# Patient Record
Sex: FEMALE | Race: WHITE | NOT HISPANIC OR LATINO
[De-identification: names, ages, dates, MRNs, and addresses within clinical notes are randomized per-mention and may not be internally consistent; named-entity substitution may affect disease eponyms.]

---

## 2017-06-23 ENCOUNTER — APPOINTMENT (OUTPATIENT)
Dept: ORTHOPEDIC SURGERY | Facility: CLINIC | Age: 77
End: 2017-06-23

## 2017-06-23 VITALS
TEMPERATURE: 97.9 F | WEIGHT: 146 LBS | BODY MASS INDEX: 29.43 KG/M2 | DIASTOLIC BLOOD PRESSURE: 78 MMHG | HEART RATE: 88 BPM | HEIGHT: 59 IN | SYSTOLIC BLOOD PRESSURE: 133 MMHG

## 2017-06-23 DIAGNOSIS — T84.84XA PAIN DUE TO INTERNAL ORTHOPEDIC PROSTHETIC DEVICES, IMPLANTS AND GRAFTS, INITIAL ENCOUNTER: ICD-10-CM

## 2017-06-23 DIAGNOSIS — Z96.659 PAIN DUE TO INTERNAL ORTHOPEDIC PROSTHETIC DEVICES, IMPLANTS AND GRAFTS, INITIAL ENCOUNTER: ICD-10-CM

## 2017-10-20 ENCOUNTER — APPOINTMENT (OUTPATIENT)
Dept: ORTHOPEDIC SURGERY | Facility: CLINIC | Age: 77
End: 2017-10-20
Payer: MEDICARE

## 2017-10-20 VITALS
BODY MASS INDEX: 29.43 KG/M2 | HEART RATE: 66 BPM | SYSTOLIC BLOOD PRESSURE: 127 MMHG | WEIGHT: 146 LBS | DIASTOLIC BLOOD PRESSURE: 83 MMHG | HEIGHT: 59 IN

## 2017-10-20 DIAGNOSIS — Z47.1 AFTERCARE FOLLOWING JOINT REPLACEMENT SURGERY: ICD-10-CM

## 2017-10-20 DIAGNOSIS — Z96.651 AFTERCARE FOLLOWING JOINT REPLACEMENT SURGERY: ICD-10-CM

## 2017-10-20 DIAGNOSIS — Z96.651 PRESENCE OF RIGHT ARTIFICIAL KNEE JOINT: ICD-10-CM

## 2017-10-20 PROCEDURE — 73562 X-RAY EXAM OF KNEE 3: CPT | Mod: RT

## 2017-10-20 PROCEDURE — 99213 OFFICE O/P EST LOW 20 MIN: CPT

## 2018-10-29 ENCOUNTER — APPOINTMENT (OUTPATIENT)
Dept: PULMONOLOGY | Facility: CLINIC | Age: 78
End: 2018-10-29
Payer: MEDICARE

## 2018-10-29 VITALS — SYSTOLIC BLOOD PRESSURE: 122 MMHG | DIASTOLIC BLOOD PRESSURE: 82 MMHG

## 2018-10-29 VITALS — HEART RATE: 86 BPM | WEIGHT: 150 LBS | OXYGEN SATURATION: 98 % | HEIGHT: 59 IN | BODY MASS INDEX: 30.24 KG/M2

## 2018-10-29 DIAGNOSIS — J32.9 CHRONIC SINUSITIS, UNSPECIFIED: ICD-10-CM

## 2018-10-29 DIAGNOSIS — K74.3 PRIMARY BILIARY CIRRHOSIS: ICD-10-CM

## 2018-10-29 PROCEDURE — 94060 EVALUATION OF WHEEZING: CPT

## 2018-10-29 PROCEDURE — 99204 OFFICE O/P NEW MOD 45 MIN: CPT | Mod: 25

## 2018-10-29 PROCEDURE — 94664 DEMO&/EVAL PT USE INHALER: CPT | Mod: 59

## 2019-04-16 ENCOUNTER — TRANSCRIPTION ENCOUNTER (OUTPATIENT)
Age: 79
End: 2019-04-16

## 2019-04-16 ENCOUNTER — APPOINTMENT (OUTPATIENT)
Dept: PULMONOLOGY | Facility: CLINIC | Age: 79
End: 2019-04-16
Payer: MEDICARE

## 2019-04-16 VITALS — BODY MASS INDEX: 30.7 KG/M2 | WEIGHT: 152 LBS

## 2019-04-16 VITALS — HEART RATE: 87 BPM | OXYGEN SATURATION: 97 % | SYSTOLIC BLOOD PRESSURE: 140 MMHG | DIASTOLIC BLOOD PRESSURE: 80 MMHG

## 2019-04-16 PROCEDURE — 94010 BREATHING CAPACITY TEST: CPT

## 2019-04-16 PROCEDURE — 99214 OFFICE O/P EST MOD 30 MIN: CPT | Mod: 25

## 2019-04-16 NOTE — CONSULT LETTER
[Dear  ___] : Dear  [unfilled], [Consult Letter:] : I had the pleasure of evaluating your patient, [unfilled]. [Please see my note below.] : Please see my note below. [Consult Closing:] : Thank you very much for allowing me to participate in the care of this patient.  If you have any questions, please do not hesitate to contact me. [Sincerely,] : Sincerely, [DrSilvia  ___] : Dr. LEONARD

## 2019-04-16 NOTE — ASSESSMENT
[FreeTextEntry1] : Mild intermittent asthma controlled with LABA/ICS\par Silent Sinus Syndrome - inactive\par Suspect mild GERD\par Obesity

## 2019-04-16 NOTE — PHYSICAL EXAM
[Normal Conjunctiva] : the conjunctiva exhibited no abnormalities [Normal Oropharynx] : normal oropharynx [Enlarged Base of the Tongue] : enlargement of the base of the tongue [I] : I [Neck Appearance] : the appearance of the neck was normal [Jugular Venous Distention Increased] : there was no jugular-venous distention [Thyroid Diffuse Enlargement] : the thyroid was not enlarged [Heart Sounds] : normal S1 and S2 [Arterial Pulses Normal] : the arterial pulses were normal [Edema] : no peripheral edema present [Respiration, Rhythm And Depth] : normal respiratory rhythm and effort [Auscultation Breath Sounds / Voice Sounds] : lungs were clear to auscultation bilaterally [Lungs Percussion] : the lungs were normal to percussion [Bowel Sounds] : normal bowel sounds [Abdomen Soft] : soft [Abdomen Tenderness] : non-tender [Abnormal Walk] : normal gait [Nail Clubbing] : no clubbing of the fingernails [Cyanosis, Localized] : no localized cyanosis [No Focal Deficits] : no focal deficits [Oriented To Time, Place, And Person] : oriented to person, place, and time [Impaired Insight] : insight and judgment were intact [Affect] : the affect was normal [Memory Recent] : recent memory was not impaired [Skin Turgor] : normal skin turgor [] : no rash [FreeTextEntry1] : obese [Low Lying Soft Palate] : no low lying soft palate

## 2019-04-16 NOTE — HISTORY OF PRESENT ILLNESS
[FreeTextEntry1] : 77 yo obese female distant trivial smoker with long-standing, mild asthma managed by Dr Alanis, now looking for FU in the [facility] system.\par Chronic, stable, mild, two flight ASKEW relieved with 1 minute of rest\par Silent sinus syndrome followed by Dr Queen in the past - largely resolved post drainage\par Virtually never requiring steroids\par Never intubated\par No known allergies\par No asa intolerance nor nasal polyposis\par Controlled on Symbicort alone\par Rarely needs rescue albuterol \par \par 4/16/19\par Doing OK\par Symbicort is expensive

## 2019-04-24 ENCOUNTER — APPOINTMENT (OUTPATIENT)
Dept: OTOLARYNGOLOGY | Facility: CLINIC | Age: 79
End: 2019-04-24
Payer: MEDICARE

## 2019-04-24 VITALS
SYSTOLIC BLOOD PRESSURE: 134 MMHG | DIASTOLIC BLOOD PRESSURE: 80 MMHG | HEART RATE: 85 BPM | BODY MASS INDEX: 29.84 KG/M2 | WEIGHT: 148 LBS | HEIGHT: 59 IN

## 2019-04-24 DIAGNOSIS — J34.89 OTHER SPECIFIED DISORDERS OF NOSE AND NASAL SINUSES: ICD-10-CM

## 2019-04-24 DIAGNOSIS — H05.412: ICD-10-CM

## 2019-04-24 PROCEDURE — 31231 NASAL ENDOSCOPY DX: CPT

## 2019-04-24 PROCEDURE — 99214 OFFICE O/P EST MOD 30 MIN: CPT | Mod: 25

## 2019-04-24 NOTE — REASON FOR VISIT
[Initial Evaluation] : an initial evaluation for [Spouse] : spouse [FreeTextEntry2] : Patient of Dr. Queen, Initial visit for silent sinus syndrome in the left maxillary sinus, s/p FESS 7-'15.

## 2019-04-24 NOTE — PHYSICAL EXAM
[Nasal Endoscopy Performed] : nasal endoscopy was performed, see procedure section for findings [Midline] : trachea located in midline position [Normal] : no rashes [de-identified] : Left eye sunken from silent sinus syndrome.

## 2019-04-24 NOTE — HISTORY OF PRESENT ILLNESS
[de-identified] : 78 year old female patient of Dr. Queen, Initial visit for silent sinus syndrome in the left maxillary sinus, s/p FESS 7-'15.  Patient reports recent dental work performed and CT done 3/21/19 impression showing Maxillary is edentulous.  There is bilateral alveolar resorption.  Hypoplastic left maxillary sinus is completely opacified.  There is left silent sinus syndrome.  In the expected location of ADA #six, cross-sectional image 52, the alveolar bone width is less than 1.0mm and alveolar bone height is 12.8mm.  Residual bone measurement as above, measurement prior to surgery recommended.  Patient report sinus pressure on Left side. Denies nasal congestion, sinus pain, rhinorrhea, post nasal drip and sinus infections in the past year. [FreeTextEntry1] : Patient states is a sinus infection since having left maxillary sinus opened.

## 2019-04-24 NOTE — CONSULT LETTER
[Dear  ___] : Dear  [unfilled], [Consult Letter:] : I had the pleasure of evaluating your patient, [unfilled]. [Please see my note below.] : Please see my note below. [Consult Closing:] : Thank you very much for allowing me to participate in the care of this patient.  If you have any questions, please do not hesitate to contact me. [Sincerely,] : Sincerely, [FreeTextEntry3] : Beto Queen MD, KRIS, FACS\par  Department Otolaryngology\par Director of [facility]\par Professor of Otolaryngology, \par [employer] [facility]\par  [DrSilvia  ___] : Dr. LEONARD [FreeTextEntry2] : Lexa Green DDS\par [address] [place] [address]\[place] [address]\par

## 2019-04-24 NOTE — PROCEDURE
[Topical Lidocaine] : topical lidocaine [Oxymetazoline HCl] : oxymetazoline HCl [Flexible Endoscope] : examined with the flexible endoscope [Serial Number: ___] : Serial Number: [unfilled] [Image(s) Captured] : image(s) captured and filed [Anatomical Abnormality] : anatomical abnormality [Recalcitrant Symptoms] : recalcitrant symptoms  [Anterior rhinoscopy insufficient to account for symptoms] : anterior rhinoscopy insufficient to account for symptoms [Nasal Mucosa] : no polyps [Nasal Mucosa Crusts / Sores] : no lesions [Normal] : the nasopharynx was normal [Paranasal Sinuses Middle Meatus] : no purulence [Paranasal Sinuses Maxillary Sinus] : no maxillary polyps [Paranasal Sinuses Ethmoid Sinus] : no ethmoid polyps [] : bilateral patent antrostomies [Paranasal Sinuses Sphenoid Sinus] : no spenoid polyps [FreeTextEntry6] : Pre-op indication(s): \par Post-op indication(s): \par Verbal consent obtained from patient.\par “Anterior rhinoscopy insufficient to account for symptoms” \par Details for procedure: \par Scope #: \par Type of scope:    flexible fiber optic telescope     Rigid glass telescope \par Anesthesia and/or vasoconstriction was achieved topically by using: \par 4% Lidocaine spray   0.05% Oxymetazoline     Other ______ \par The following anatomic sites were directly examined in a sequential fashion: \par The scope was introduced in the nasal passage between the middle and inferior turbinates to exam the inferior portion of the middle meatus and the fontanelle, as well as the maxillary ostia. Next, the scope was passed medially and posteriorly to the middle turbinates to examine the sphenoethmoid recess and the superior turbinate region. \par Upon visualization the finders are as follows: \par Nasal Septum:   Normal    Deviated to   left    right   Spur left   right   Perforation    Crust \par Bleeding site cauterized:    Anterior   left   right   Posterior   left   right \par Method:   Silver Nitrate   YAG Laser    Electrocautery ______ \par Right Side: \par * Mucosa: Normal\par * Mucous: Normal\par * Polyp: Normal\par * Inferior Turbinate: Normal\par * Middle Turbinate: Normal\par * Superior Turbinate: Normal\par * Inferior Meatus: Normal\par * Middle Meatus: Normal\par * Super Meatus: Normal\par * Sphenoethmoidal Recess: Normal\par Left Side: \par * Mucosa: Normal\par * Mucous: Normal\par * Polyp: Normal\par * Inferior Turbinate: Normal\par * Middle Turbinate: Normal\par * Superior Turbinate: Normal\par * Inferior Meatus: Normal\par * Middle Meatus: Maxillary opening visible. No fluid suctioned, sinus dry at this time\par * Super Meatus: Normal\par * Sphenoethmoidal Recess: Normal\par The patient tolerated the procedure well without any complications.\par \par \par  [FreeTextEntry4] : Left nasal dorsum and secondary to silent sinus syndrome small ostium into sinus still patent

## 2019-10-15 ENCOUNTER — APPOINTMENT (OUTPATIENT)
Dept: PULMONOLOGY | Facility: CLINIC | Age: 79
End: 2019-10-15
Payer: MEDICARE

## 2019-10-15 VITALS
HEIGHT: 58.66 IN | OXYGEN SATURATION: 97 % | DIASTOLIC BLOOD PRESSURE: 62 MMHG | WEIGHT: 152 LBS | SYSTOLIC BLOOD PRESSURE: 116 MMHG | BODY MASS INDEX: 31.06 KG/M2 | HEART RATE: 70 BPM

## 2019-10-15 PROCEDURE — 99214 OFFICE O/P EST MOD 30 MIN: CPT

## 2019-10-15 NOTE — HISTORY OF PRESENT ILLNESS
[FreeTextEntry1] : 79 yo obese female distant trivial smoker with long-standing, mild asthma managed by Dr Alanis, now looking for FU in the [facility] system.\par Chronic, stable, mild, two flight ASKEW relieved with 1 minute of rest\par Silent sinus syndrome followed by Dr Queen in the past - largely resolved post drainage\par Virtually never requiring steroids\par Never intubated\par No known allergies\par No asa intolerance nor nasal polyposis\par Controlled on Symbicort alone\par Rarely needs rescue albuterol \par \par 4/16/19\par Doing OK\par Symbicort is expensive

## 2019-10-15 NOTE — ASSESSMENT
[FreeTextEntry1] : Mild intermittent asthma controlled with LABA/ICS\par Silent Sinus Syndrome - inactive\par Suspect mild GERD\par Obesity\par \par 10/15/19\par Doing well\par Trying to lose weight

## 2020-06-19 ENCOUNTER — APPOINTMENT (OUTPATIENT)
Dept: PULMONOLOGY | Facility: CLINIC | Age: 80
End: 2020-06-19
Payer: MEDICARE

## 2020-06-19 VITALS
HEIGHT: 58 IN | DIASTOLIC BLOOD PRESSURE: 80 MMHG | SYSTOLIC BLOOD PRESSURE: 132 MMHG | HEART RATE: 79 BPM | OXYGEN SATURATION: 98 % | RESPIRATION RATE: 14 BRPM | BODY MASS INDEX: 31.91 KG/M2 | WEIGHT: 152 LBS

## 2020-06-19 PROCEDURE — 99214 OFFICE O/P EST MOD 30 MIN: CPT

## 2020-06-19 RX ORDER — LEVOTHYROXINE SODIUM 0.1 MG/1
100 TABLET ORAL
Refills: 0 | Status: ACTIVE | COMMUNITY

## 2020-06-19 NOTE — PHYSICAL EXAM
[Normal Conjunctiva] : the conjunctiva exhibited no abnormalities [Normal Oropharynx] : normal oropharynx [Enlarged Base of the Tongue] : enlargement of the base of the tongue [I] : I [Neck Appearance] : the appearance of the neck was normal [Jugular Venous Distention Increased] : there was no jugular-venous distention [Thyroid Diffuse Enlargement] : the thyroid was not enlarged [Heart Sounds] : normal S1 and S2 [Arterial Pulses Normal] : the arterial pulses were normal [Edema] : no peripheral edema present [Respiration, Rhythm And Depth] : normal respiratory rhythm and effort [Lungs Percussion] : the lungs were normal to percussion [Auscultation Breath Sounds / Voice Sounds] : lungs were clear to auscultation bilaterally [Bowel Sounds] : normal bowel sounds [Abdomen Soft] : soft [Abdomen Tenderness] : non-tender [Abnormal Walk] : normal gait [Cyanosis, Localized] : no localized cyanosis [Nail Clubbing] : no clubbing of the fingernails [No Focal Deficits] : no focal deficits [Oriented To Time, Place, And Person] : oriented to person, place, and time [Impaired Insight] : insight and judgment were intact [Memory Recent] : recent memory was not impaired [Affect] : the affect was normal [] : no rash [Skin Turgor] : normal skin turgor [FreeTextEntry1] : obese [Low Lying Soft Palate] : no low lying soft palate

## 2020-06-19 NOTE — HISTORY OF PRESENT ILLNESS
[FreeTextEntry1] : 79 yo obese female distant trivial smoker with long-standing, mild asthma managed by Dr Alanis, now looking for FU in the [email or web address] system.\par Chronic, stable, mild, two flight ASKEW relieved with 1 minute of rest\par Silent sinus syndrome followed by Dr Queen in the past - largely resolved post drainage\par Virtually never requiring steroids\par Never intubated\par No known allergies\par No asa intolerance nor nasal polyposis\par Controlled on Symbicort alone\par Rarely needs rescue albuterol \par \par 4/16/19\par Doing OK\par Symbicort is expensive\par \par 6/19/20\par Back pain\par Slight increase SOB\par Hasn't used rescue albuterol\par \par The patient has been practicing safety guidelines for COVID-19 pandemic crisis (including social distancing, mask use and hand washing) and has been mostly homebound.\par Didn't let her son visit.  Anxious over COVID\par

## 2020-06-19 NOTE — ASSESSMENT
[FreeTextEntry1] : Mild intermittent asthma controlled with LABA/ICS\par Silent Sinus Syndrome - inactive\par Suspect mild GERD\par Obesity\par Low back pain\par Anxiety

## 2020-06-19 NOTE — CONSULT LETTER
[Consult Letter:] : I had the pleasure of evaluating your patient, [unfilled]. [Dear  ___] : Dear  [unfilled], [Please see my note below.] : Please see my note below. [Sincerely,] : Sincerely, [Consult Closing:] : Thank you very much for allowing me to participate in the care of this patient.  If you have any questions, please do not hesitate to contact me. [DrSilvia  ___] : Dr. LEONARD

## 2020-06-23 ENCOUNTER — RX RENEWAL (OUTPATIENT)
Age: 80
End: 2020-06-23

## 2020-09-09 RX ORDER — METHYLPREDNISOLONE 4 MG/1
4 TABLET ORAL
Qty: 1 | Refills: 1 | Status: DISCONTINUED | COMMUNITY
Start: 2020-06-19 | End: 2020-09-09

## 2020-10-22 ENCOUNTER — APPOINTMENT (OUTPATIENT)
Dept: PULMONOLOGY | Facility: CLINIC | Age: 80
End: 2020-10-22
Payer: MEDICARE

## 2020-10-22 VITALS
BODY MASS INDEX: 31.35 KG/M2 | HEART RATE: 87 BPM | WEIGHT: 150 LBS | DIASTOLIC BLOOD PRESSURE: 80 MMHG | OXYGEN SATURATION: 96 % | SYSTOLIC BLOOD PRESSURE: 138 MMHG

## 2020-10-22 PROCEDURE — 99214 OFFICE O/P EST MOD 30 MIN: CPT

## 2020-10-22 RX ORDER — ROSUVASTATIN CALCIUM 10 MG/1
10 TABLET, FILM COATED ORAL
Refills: 0 | Status: ACTIVE | COMMUNITY

## 2020-10-22 NOTE — ASSESSMENT
[FreeTextEntry1] : Mild intermittent asthma controlled with LABA/ICS\par Silent Sinus Syndrome - inactive\par Suspect mild GERD\par Obesity\par Low back pain\par Anxiety\par Dyspnea related to weight and deconditioning exacerbated by COVID and Back pain\par Tiny nodules in a 80 year old distant trivial smoker without exposures or FH of malignancy does not warrant FU imaging.  In fact FU imaging is more likely to lead to unjustified LE with risk of harm than to be beneficial

## 2020-10-22 NOTE — HISTORY OF PRESENT ILLNESS
[FreeTextEntry1] : 77 yo obese female distant trivial smoker with long-standing, mild asthma managed by Dr Alanis, now looking for FU in the [facility] system.\par Chronic, stable, mild, two flight ASKEW relieved with 1 minute of rest\par Silent sinus syndrome followed by Dr Queen in the past - largely resolved post drainage\par Virtually never requiring steroids\par Never intubated\par No known allergies\par No asa intolerance nor nasal polyposis\par Controlled on Symbicort alone\par Rarely needs rescue albuterol \par \par 4/16/19\par Doing OK\par Symbicort is expensive\par \par 6/19/20\par Back pain\par Slight increase SOB\par Hasn't used rescue albuterol\par \par The patient has been practicing safety guidelines for COVID-19 pandemic crisis (including social distancing, mask use and hand washing) and has been mostly homebound.\par Didn't let her son visit.  Anxious over COVID\par \par 10/22/20\par The patient has been practicing safety guidelines for COVID-19 pandemic crisis (including social distancing, mask use and hand washing) and has been mostly homebound.\par Mild ASKEW\par Cardiac CT with small nodules\par

## 2020-12-02 ENCOUNTER — APPOINTMENT (OUTPATIENT)
Dept: NEUROSURGERY | Facility: CLINIC | Age: 80
End: 2020-12-02
Payer: MEDICARE

## 2020-12-02 VITALS
HEART RATE: 82 BPM | HEIGHT: 58 IN | WEIGHT: 148 LBS | SYSTOLIC BLOOD PRESSURE: 152 MMHG | DIASTOLIC BLOOD PRESSURE: 84 MMHG | TEMPERATURE: 97.7 F | OXYGEN SATURATION: 98 % | BODY MASS INDEX: 31.07 KG/M2

## 2020-12-02 DIAGNOSIS — Z86.39 PERSONAL HISTORY OF OTHER ENDOCRINE, NUTRITIONAL AND METABOLIC DISEASE: ICD-10-CM

## 2020-12-02 DIAGNOSIS — Z87.09 PERSONAL HISTORY OF OTHER DISEASES OF THE RESPIRATORY SYSTEM: ICD-10-CM

## 2020-12-02 PROCEDURE — 99203 OFFICE O/P NEW LOW 30 MIN: CPT

## 2020-12-03 PROBLEM — Z86.39 HISTORY OF HIGH CHOLESTEROL: Status: RESOLVED | Noted: 2020-12-02 | Resolved: 2020-12-03

## 2020-12-03 PROBLEM — Z87.09 HISTORY OF ASTHMA: Status: RESOLVED | Noted: 2020-12-02 | Resolved: 2020-12-03

## 2020-12-03 NOTE — ASSESSMENT
[FreeTextEntry1] : Ms. Tellez presents with above history.  Patient presents with symptoms consistent with lumbar spinal stenosis with neurogenic claudication,  \par Plan:\par MRI lumbar spine w/o contrast to assess her spinal stenosis.\par Patient wishes to consider surgical options.\par f/u after imaging.\par Patient knows to call the office if there are any new or worsening symptoms.\par

## 2020-12-03 NOTE — REVIEW OF SYSTEMS
[As Noted in HPI] : as noted in HPI [Negative] : Heme/Lymph [Numbness] : numbness [Tingling] : tingling [Difficulty Walking] : difficulty walking [FreeTextEntry4] : Ringing in right ear  [FreeTextEntry9] : Muscle pain

## 2020-12-03 NOTE — REASON FOR VISIT
[New Patient Visit] : a new patient visit [Referred By: _________] : Patient was referred by HORACIO [FreeTextEntry1] : lumbar spinal stenosis with neurogenic claudication

## 2020-12-07 ENCOUNTER — APPOINTMENT (OUTPATIENT)
Dept: NEUROSURGERY | Facility: CLINIC | Age: 80
End: 2020-12-07
Payer: MEDICARE

## 2020-12-07 VITALS
WEIGHT: 148 LBS | SYSTOLIC BLOOD PRESSURE: 161 MMHG | TEMPERATURE: 97.7 F | HEIGHT: 58 IN | HEART RATE: 95 BPM | OXYGEN SATURATION: 98 % | BODY MASS INDEX: 31.07 KG/M2 | DIASTOLIC BLOOD PRESSURE: 87 MMHG

## 2020-12-07 PROCEDURE — 99215 OFFICE O/P EST HI 40 MIN: CPT

## 2020-12-07 NOTE — DATA REVIEWED
[de-identified] : MRI of the LS spine shows a grade 2 L4-5 spondylolisthesis with associated central stenosis and bilateral foraminal stenosis.

## 2020-12-07 NOTE — PHYSICAL EXAM
[General Appearance - Alert] : alert [General Appearance - In No Acute Distress] : in no acute distress [General Appearance - Well Nourished] : well nourished [General Appearance - Well Developed] : well developed [General Appearance - Well-Appearing] : healthy appearing [Oriented To Time, Place, And Person] : oriented to person, place, and time [Person] : oriented to person [Place] : oriented to place [Time] : oriented to time [Short Term Intact] : short term memory intact [Concentration Intact] : normal concentrating ability [Fluency] : fluency intact [Cranial Nerves Optic (II)] : visual acuity intact bilaterally,  pupils equal round and reactive to light [Cranial Nerves Oculomotor (III)] : extraocular motion intact [Motor Tone] : muscle tone was normal in all four extremities [Motor Strength] : muscle strength was normal in all four extremities [Sensation Tactile Decrease] : light touch was intact [Abnormal Walk] : normal gait [FreeTextEntry6] : LE 5/5 bilaterally

## 2020-12-07 NOTE — HISTORY OF PRESENT ILLNESS
[FreeTextEntry1] : Ms. Tellez presents for follow-up and review of imaging.  She has a 6-7 year history of worsening LBP and bilateral LE pain.  The patient states that she is never pain free.  At worst, the pain is 10/10.  It is improved to a level of 7 with oxycodone and muscle relaxers.  The patient states that the pain is 80% lower back and 20% buttock and lower extremities.  Of the LE discomfort, the pain is 60% on the right and 40% on the left.  The patient also has symptoms consistent with neurogenic claudication with LE heaviness and numbness when ambulating.  She denies incontinence.  The patient has tried PT, chiropractic care, RFA, and ASHUTOSH without significant improvement.  She wishes to consider surgical intervention.

## 2020-12-07 NOTE — REASON FOR VISIT
[Follow-Up: _____] : a [unfilled] follow-up visit [Spouse] : spouse [FreeTextEntry1] : lumbar spinal stenosis with neurogenic claudication; grade 2 L4-5 spondylolisthesis

## 2020-12-07 NOTE — ASSESSMENT
[FreeTextEntry1] : Ms. Tellez presents for follow-up with interval history and imaging findings as above.  She has back pain, radiculopathy, and neurogenic claudication that is referable to her L4-5 spondylolisthesis with stenosis.  She has failed conservative therapy.  I have explained the risks, benefits, and alternatives of surgical intervention to the patient and her  as below:\par benefit: hopeful decompression, hopeful stabilization of the spine, hopeful improvement in symptoms, hopeful prevention of progression of deficit \par alternative: no surgical intervention; continued conservative therapy (PT, pain management)\par risks: bleeding, infection, CSF leak, failure of procedure or instrumentation, pseudoarthrosis, adjacent level degeneration, need for re-operation, worsening pain, seizure, stroke, coma, death, DVT, PE, MI, PNA, UTI, difficulty/failure to intubate or extubate, new or worsening numbness, tingling, weakness, paralysis, sensory changes, difficulty/inability to ambulate, sexual dysfunction, incontinence\par The patient and her  verbalize their understanding of the above.  I have answered all their questions.  They wish to consider the aforementioned options and will call the office should the wish to schedule surgical intervention.

## 2021-01-13 ENCOUNTER — OUTPATIENT (OUTPATIENT)
Dept: OUTPATIENT SERVICES | Facility: HOSPITAL | Age: 81
LOS: 1 days | End: 2021-01-13
Payer: MEDICARE

## 2021-01-13 VITALS
WEIGHT: 143.08 LBS | DIASTOLIC BLOOD PRESSURE: 80 MMHG | RESPIRATION RATE: 16 BRPM | HEART RATE: 83 BPM | SYSTOLIC BLOOD PRESSURE: 130 MMHG | TEMPERATURE: 98 F | HEIGHT: 58 IN

## 2021-01-13 DIAGNOSIS — E03.9 HYPOTHYROIDISM, UNSPECIFIED: ICD-10-CM

## 2021-01-13 DIAGNOSIS — Z29.9 ENCOUNTER FOR PROPHYLACTIC MEASURES, UNSPECIFIED: ICD-10-CM

## 2021-01-13 DIAGNOSIS — Z98.51 TUBAL LIGATION STATUS: Chronic | ICD-10-CM

## 2021-01-13 DIAGNOSIS — Z98.89 OTHER SPECIFIED POSTPROCEDURAL STATES: Chronic | ICD-10-CM

## 2021-01-13 DIAGNOSIS — Z96.651 PRESENCE OF RIGHT ARTIFICIAL KNEE JOINT: Chronic | ICD-10-CM

## 2021-01-13 DIAGNOSIS — Z90.89 ACQUIRED ABSENCE OF OTHER ORGANS: Chronic | ICD-10-CM

## 2021-01-13 DIAGNOSIS — M48.062 SPINAL STENOSIS, LUMBAR REGION WITH NEUROGENIC CLAUDICATION: ICD-10-CM

## 2021-01-13 DIAGNOSIS — H26.40 UNSPECIFIED SECONDARY CATARACT: Chronic | ICD-10-CM

## 2021-01-13 DIAGNOSIS — Z01.818 ENCOUNTER FOR OTHER PREPROCEDURAL EXAMINATION: ICD-10-CM

## 2021-01-13 LAB
A1C WITH ESTIMATED AVERAGE GLUCOSE RESULT: 5.4 % — SIGNIFICANT CHANGE UP (ref 4–5.6)
ANION GAP SERPL CALC-SCNC: 7 MMOL/L — SIGNIFICANT CHANGE UP (ref 5–17)
APTT BLD: 32 SEC — SIGNIFICANT CHANGE UP (ref 27.5–35.5)
BASOPHILS # BLD AUTO: 0.04 K/UL — SIGNIFICANT CHANGE UP (ref 0–0.2)
BASOPHILS NFR BLD AUTO: 0.6 % — SIGNIFICANT CHANGE UP (ref 0–2)
BLD GP AB SCN SERPL QL: SIGNIFICANT CHANGE UP
BUN SERPL-MCNC: 17 MG/DL — SIGNIFICANT CHANGE UP (ref 8–20)
CALCIUM SERPL-MCNC: 9.8 MG/DL — SIGNIFICANT CHANGE UP (ref 8.6–10.2)
CHLORIDE SERPL-SCNC: 105 MMOL/L — SIGNIFICANT CHANGE UP (ref 98–107)
CO2 SERPL-SCNC: 28 MMOL/L — SIGNIFICANT CHANGE UP (ref 22–29)
CREAT SERPL-MCNC: 0.61 MG/DL — SIGNIFICANT CHANGE UP (ref 0.5–1.3)
EOSINOPHIL # BLD AUTO: 0.08 K/UL — SIGNIFICANT CHANGE UP (ref 0–0.5)
EOSINOPHIL NFR BLD AUTO: 1.2 % — SIGNIFICANT CHANGE UP (ref 0–6)
ESTIMATED AVERAGE GLUCOSE: 108 MG/DL — SIGNIFICANT CHANGE UP (ref 68–114)
GLUCOSE SERPL-MCNC: 101 MG/DL — HIGH (ref 70–99)
HCT VFR BLD CALC: 43.2 % — SIGNIFICANT CHANGE UP (ref 34.5–45)
HGB BLD-MCNC: 14.2 G/DL — SIGNIFICANT CHANGE UP (ref 11.5–15.5)
IMM GRANULOCYTES NFR BLD AUTO: 0.1 % — SIGNIFICANT CHANGE UP (ref 0–1.5)
INR BLD: 0.99 RATIO — SIGNIFICANT CHANGE UP (ref 0.88–1.16)
LYMPHOCYTES # BLD AUTO: 1.5 K/UL — SIGNIFICANT CHANGE UP (ref 1–3.3)
LYMPHOCYTES # BLD AUTO: 22.2 % — SIGNIFICANT CHANGE UP (ref 13–44)
MCHC RBC-ENTMCNC: 32.6 PG — SIGNIFICANT CHANGE UP (ref 27–34)
MCHC RBC-ENTMCNC: 32.9 GM/DL — SIGNIFICANT CHANGE UP (ref 32–36)
MCV RBC AUTO: 99.1 FL — SIGNIFICANT CHANGE UP (ref 80–100)
MONOCYTES # BLD AUTO: 0.63 K/UL — SIGNIFICANT CHANGE UP (ref 0–0.9)
MONOCYTES NFR BLD AUTO: 9.3 % — SIGNIFICANT CHANGE UP (ref 2–14)
MRSA PCR RESULT.: SIGNIFICANT CHANGE UP
NEUTROPHILS # BLD AUTO: 4.51 K/UL — SIGNIFICANT CHANGE UP (ref 1.8–7.4)
NEUTROPHILS NFR BLD AUTO: 66.6 % — SIGNIFICANT CHANGE UP (ref 43–77)
PLATELET # BLD AUTO: 319 K/UL — SIGNIFICANT CHANGE UP (ref 150–400)
POTASSIUM SERPL-MCNC: 4.7 MMOL/L — SIGNIFICANT CHANGE UP (ref 3.5–5.3)
POTASSIUM SERPL-SCNC: 4.7 MMOL/L — SIGNIFICANT CHANGE UP (ref 3.5–5.3)
PROTHROM AB SERPL-ACNC: 11.5 SEC — SIGNIFICANT CHANGE UP (ref 10.6–13.6)
RBC # BLD: 4.36 M/UL — SIGNIFICANT CHANGE UP (ref 3.8–5.2)
RBC # FLD: 13 % — SIGNIFICANT CHANGE UP (ref 10.3–14.5)
S AUREUS DNA NOSE QL NAA+PROBE: SIGNIFICANT CHANGE UP
SODIUM SERPL-SCNC: 140 MMOL/L — SIGNIFICANT CHANGE UP (ref 135–145)
WBC # BLD: 6.77 K/UL — SIGNIFICANT CHANGE UP (ref 3.8–10.5)
WBC # FLD AUTO: 6.77 K/UL — SIGNIFICANT CHANGE UP (ref 3.8–10.5)

## 2021-01-13 PROCEDURE — 93010 ELECTROCARDIOGRAM REPORT: CPT

## 2021-01-13 PROCEDURE — G0463: CPT

## 2021-01-13 PROCEDURE — 93005 ELECTROCARDIOGRAM TRACING: CPT

## 2021-01-13 RX ORDER — SODIUM CHLORIDE 9 MG/ML
3 INJECTION INTRAMUSCULAR; INTRAVENOUS; SUBCUTANEOUS EVERY 8 HOURS
Refills: 0 | Status: DISCONTINUED | OUTPATIENT
Start: 2021-01-20 | End: 2021-01-20

## 2021-01-13 RX ORDER — CEFAZOLIN SODIUM 1 G
2000 VIAL (EA) INJECTION ONCE
Refills: 0 | Status: COMPLETED | OUTPATIENT
Start: 2021-01-20 | End: 2021-01-20

## 2021-01-13 NOTE — H&P PST ADULT - NSICDXPROBLEM_GEN_ALL_CORE_FT
PROBLEM DIAGNOSES  Problem: Hypothyroidism  Assessment and Plan: Pt to continue current medication regimen as per PCP instruction     Problem: Spinal stenosis of lumbar region with neurogenic claudication  Assessment and Plan: L4-L5 POSTERIOR LUMBAR INTERBODY FUSION. f/u with PCP and cardiologist for clearance     Problem: Need for prophylactic measure  Assessment and Plan: High risk surgical team to determine prophylactic intervention

## 2021-01-13 NOTE — H&P PST ADULT - NSICDXFAMILYHX_GEN_ALL_CORE_FT
FAMILY HISTORY:  Father  Still living? No  Family history of colon cancer requiring screening colonoscopy, Age at diagnosis: 81-90    Mother  Still living? No  Family history of colon cancer requiring screening colonoscopy, Age at diagnosis: 81-90    Sibling  Still living? Yes, Estimated age: 61-70  Family history of Hashimoto thyroiditis, Age at diagnosis: Age Unknown    Grandparent  Still living? No  Family history of ovarian cancer, Age at diagnosis: Age Unknown

## 2021-01-13 NOTE — H&P PST ADULT - NSICDXPASTMEDICALHX_GEN_ALL_CORE_FT
PAST MEDICAL HISTORY:  Asthma     Biliary cirrhosis     Diabetes mellitus recently diagnosed, diet controlled    H/O: hypothyroidism     Hyperlipemia     Melanoma upper lip- excised in 2009    OA (osteoarthritis)     Osteoarthritis     Rheumatoid arthritis     Rosacea     Spinal stenosis of lumbar region with neurogenic claudication

## 2021-01-13 NOTE — H&P PST ADULT - NSANTHOSAYNRD_GEN_A_CORE
No. AKHIL screening performed.  STOP BANG Legend: 0-2 = LOW Risk; 3-4 = INTERMEDIATE Risk; 5-8 = HIGH Risk

## 2021-01-13 NOTE — H&P PST ADULT - NSICDXPASTSURGICALHX_GEN_ALL_CORE_FT
PAST SURGICAL HISTORY:  After cataract left (2011)    H/O eye surgery left eye- retinal surgery 2010    H/O sinus surgery left maxillary sinus    H/O total knee replacement, right     History of appendectomy 1969    History of carpal tunnel surgery of right wrist     History of D&C x4    History of hysterectomy 1993    History of laparoscopic cholecystectomy 2012    History of tonsillectomy     History of tubal ligation     melanoma surgery on upper lip 2009    S/P sinus surgery turbinectomy, ethmoidectomy and deviated septum repair (1991)

## 2021-01-13 NOTE — H&P PST ADULT - HISTORY OF PRESENT ILLNESS
81 y/o female with HLD, OA, Asthma, hypothyroidism, DM Type 2(diet control), Lower back pain, seen today pre-op for L4-L5 Posterior lumbar interbody fusion. Pt report longstanding  lumbar pain that has progressively gotten worse within the past one year. Report back pain worse on ambulation, climbing stairs, weight bearing activity and ADLs. Report pain radiates to bilateral lower extremities and buttocks, rate worse pain as 10/10. Pain relieved with Percocet, muscle relaxer and rest. Report prior conservative treatment with pain management, pt underwent steroid injections, physical therapy and  chiropractic treatment without any significant improvement.   Pt seen today for a scheduled surgery on 1/20/2021 with Dr. Carrillo.

## 2021-01-13 NOTE — H&P PST ADULT - REASON FOR ADMISSION
" Pre-testing for lower back surgery, he is going to put some rods and screws in my back" " Pre-testing for lower back surgery, Dr. Carrillo is going to put some rods and screws in my back"

## 2021-01-13 NOTE — PATIENT PROFILE ADULT - NSPROHMSYMPCOND_GEN_A_NUR
pre-op wash, MRSA/MSSA & pain management , Covid testing reviewed. Pt given spine booklet & instructed to call surgeon if she has any questions

## 2021-01-13 NOTE — H&P PST ADULT - ASSESSMENT
81 y/o female with HLD, OA, Asthma, hypothyroidism, DM Type 2(diet control), Lower back pain, seen today pre-op for L4-L5 Posterior lumbar interbody fusion. Pt report longstanding  lumbar pain that has progressively gotten worse within the past one year. Report back pain worse on ambulation, climbing stairs, weight bearing activity and ADLs. Report pain radiates to bilateral lower extremities and buttocks, rate worse pain as 10/10. Pain relieved with Percocet, muscle relaxer and rest. Report prior conservative treatment with pain management, pt underwent steroid injections, physical therapy and  chiropractic treatment without any significant improvement.   Pt seen today for a scheduled surgery on 2021 with Dr. Carrillo. Surgery protocol reviewed with pt today. Pt scheduled with PCP and cardiologist today for clearances  CAPRINI VTE 2.0 SCORE [CLOT updated 2019]    AGE RELATED RISK FACTORS                                                       MOBILITY RELATED FACTORS  [ ] Age 41-60 years                                            (1 Point)                    [ ] Bed rest                                                        (1 Point)  [ ] Age: 61-74 years                                           (2 Points)                  [ ] Plaster cast                                                   (2 Points)  [ x] Age= 75 years                                              (3 Points)                    [ ] Bed bound for more than 72 hours                 (2 Points)    DISEASE RELATED RISK FACTORS                                               GENDER SPECIFIC FACTORS  [x ] Edema in the lower extremities                       (1 Point)              [ ] Pregnancy                                                     (1 Point)  [ ] Varicose veins                                               (1 Point)                     [ ] Post-partum < 6 weeks                                   (1 Point)             [x ] BMI > 25 Kg/m2                                            (1 Point)                     [ ] Hormonal therapy  or oral contraception          (1 Point)                 [ ] Sepsis (in the previous month)                        (1 Point)               [ ] History of pregnancy complications                 (1 point)  [ ] Pneumonia or serious lung disease                                               [ ] Unexplained or recurrent                     (1 Point)           (in the previous month)                               (1 Point)  [ ] Abnormal pulmonary function test                     (1 Point)                 SURGERY RELATED RISK FACTORS  [ ] Acute myocardial infarction                              (1 Point)               [ ]  Section                                             (1 Point)  [ ] Congestive heart failure (in the previous month)  (1 Point)      [ ] Minor surgery                                                  (1 Point)   [ ] Inflammatory bowel disease                             (1 Point)               [ ] Arthroscopic surgery                                        (2 Points)  [ ] Central venous access                                      (2 Points)                [ x] General surgery lasting more than 45 minutes (2 points)  [x ] Malignancy- Present or previous                   (2 Points)                [ ] Elective arthroplasty                                         (5 points)    [ ] Stroke (in the previous month)                          (5 Points)                                                                                                                                                           HEMATOLOGY RELATED FACTORS                                                 TRAUMA RELATED RISK FACTORS  [ ] Prior episodes of VTE                                     (3 Points)                [ ] Fracture of the hip, pelvis, or leg                       (5 Points)  [ ] Positive family history for VTE                         (3 Points)             [ ] Acute spinal cord injury (in the previous month)  (5 Points)  [ ] Prothrombin 88995 A                                     (3 Points)               [ ] Paralysis  (less than 1 month)                             (5 Points)  [ ] Factor V Leiden                                             (3 Points)                  [ ] Multiple Trauma within 1 month                        (5 Points)  [ ] Lupus anticoagulants                                     (3 Points)                                                           [ ] Anticardiolipin antibodies                               (3 Points)                                                       [ ] High homocysteine in the blood                      (3 Points)                                             [ ] Other congenital or acquired thrombophilia      (3 Points)                                                [ ] Heparin induced thrombocytopenia                  (3 Points)                                     Total Score [       9   ]  OPIOID RISK TOOL    MARISELA EACH BOX THAT APPLIES AND ADD TOTALS AT THE END    FAMILY HISTORY OF SUBSTANCE ABUSE                 FEMALE         MALE                                                Alcohol                             [  ]1 pt          [  ]3pts                                               Illegal Durgs                     [  ]2 pts        [  ]3pts                                               Rx Drugs                           [  ]4 pts        [  ]4 pts    PERSONAL HISTORY OF SUBSTANCE ABUSE                                                                                          Alcohol                             [  ]3 pts       [  ]3 pts                                               Illegal Drugs                     [  ]4 pts        [  ]4 pts                                               Rx Drugs                           [  ]5 pts        [  ]5 pts    AGE BETWEEN 16-45 YEARS                                      [  ]1 pt         [  ]1 pt    HISTORY OF PREADOLESCENT   SEXUAL ABUSE                                                             [  ]3 pts        [  ]0pts    PSYCHOLOGICAL DISEASE                     ADD, OCD, Bipolar, Schizophrenia        [  ]2 pts         [  ]2 pts                      Depression                                               [  ]1 pt           [  ]1 pt           SCORING TOTAL   (add numbers and type here)              (**0*)                                     A score of 3 or lower indicated LOW risk for future opioid abuse  A score of 4 to 7 indicated moderate risk for future opioid abuse  A score of 8 or higher indicates a high risk for opioid abuse

## 2021-01-17 ENCOUNTER — APPOINTMENT (OUTPATIENT)
Dept: DISASTER EMERGENCY | Facility: CLINIC | Age: 81
End: 2021-01-17

## 2021-01-17 DIAGNOSIS — Z01.818 ENCOUNTER FOR OTHER PREPROCEDURAL EXAMINATION: ICD-10-CM

## 2021-01-19 ENCOUNTER — TRANSCRIPTION ENCOUNTER (OUTPATIENT)
Age: 81
End: 2021-01-19

## 2021-01-19 LAB — SARS-COV-2 N GENE NPH QL NAA+PROBE: NOT DETECTED

## 2021-01-20 ENCOUNTER — INPATIENT (INPATIENT)
Facility: HOSPITAL | Age: 81
LOS: 5 days | Discharge: HOME | DRG: 460 | End: 2021-01-26
Attending: NEUROLOGICAL SURGERY | Admitting: NEUROLOGICAL SURGERY
Payer: MEDICARE

## 2021-01-20 ENCOUNTER — APPOINTMENT (OUTPATIENT)
Dept: ORTHOPEDIC SURGERY | Facility: HOSPITAL | Age: 81
End: 2021-01-20

## 2021-01-20 ENCOUNTER — APPOINTMENT (OUTPATIENT)
Dept: NEUROSURGERY | Facility: HOSPITAL | Age: 81
End: 2021-01-20
Payer: MEDICARE

## 2021-01-20 VITALS
HEIGHT: 58 IN | WEIGHT: 143.08 LBS | TEMPERATURE: 98 F | RESPIRATION RATE: 17 BRPM | OXYGEN SATURATION: 98 % | HEART RATE: 86 BPM | DIASTOLIC BLOOD PRESSURE: 68 MMHG | SYSTOLIC BLOOD PRESSURE: 135 MMHG

## 2021-01-20 DIAGNOSIS — Z98.89 OTHER SPECIFIED POSTPROCEDURAL STATES: Chronic | ICD-10-CM

## 2021-01-20 DIAGNOSIS — H26.40 UNSPECIFIED SECONDARY CATARACT: Chronic | ICD-10-CM

## 2021-01-20 DIAGNOSIS — Z90.89 ACQUIRED ABSENCE OF OTHER ORGANS: Chronic | ICD-10-CM

## 2021-01-20 DIAGNOSIS — Z96.651 PRESENCE OF RIGHT ARTIFICIAL KNEE JOINT: Chronic | ICD-10-CM

## 2021-01-20 DIAGNOSIS — Z98.51 TUBAL LIGATION STATUS: Chronic | ICD-10-CM

## 2021-01-20 DIAGNOSIS — M48.062 SPINAL STENOSIS, LUMBAR REGION WITH NEUROGENIC CLAUDICATION: ICD-10-CM

## 2021-01-20 LAB
ANION GAP SERPL CALC-SCNC: 10 MMOL/L — SIGNIFICANT CHANGE UP (ref 5–17)
BUN SERPL-MCNC: 16 MG/DL — SIGNIFICANT CHANGE UP (ref 8–20)
CALCIUM SERPL-MCNC: 8.3 MG/DL — LOW (ref 8.6–10.2)
CHLORIDE SERPL-SCNC: 109 MMOL/L — HIGH (ref 98–107)
CO2 SERPL-SCNC: 21 MMOL/L — LOW (ref 22–29)
CREAT SERPL-MCNC: 0.59 MG/DL — SIGNIFICANT CHANGE UP (ref 0.5–1.3)
GLUCOSE BLDC GLUCOMTR-MCNC: 108 MG/DL — HIGH (ref 70–99)
GLUCOSE BLDC GLUCOMTR-MCNC: 124 MG/DL — HIGH (ref 70–99)
GLUCOSE SERPL-MCNC: 118 MG/DL — HIGH (ref 70–99)
HCT VFR BLD CALC: 34.1 % — LOW (ref 34.5–45)
HGB BLD-MCNC: 11.4 G/DL — LOW (ref 11.5–15.5)
MCHC RBC-ENTMCNC: 32.9 PG — SIGNIFICANT CHANGE UP (ref 27–34)
MCHC RBC-ENTMCNC: 33.4 GM/DL — SIGNIFICANT CHANGE UP (ref 32–36)
MCV RBC AUTO: 98.6 FL — SIGNIFICANT CHANGE UP (ref 80–100)
PLATELET # BLD AUTO: 227 K/UL — SIGNIFICANT CHANGE UP (ref 150–400)
POTASSIUM SERPL-MCNC: 4.2 MMOL/L — SIGNIFICANT CHANGE UP (ref 3.5–5.3)
POTASSIUM SERPL-SCNC: 4.2 MMOL/L — SIGNIFICANT CHANGE UP (ref 3.5–5.3)
RBC # BLD: 3.46 M/UL — LOW (ref 3.8–5.2)
RBC # FLD: 13 % — SIGNIFICANT CHANGE UP (ref 10.3–14.5)
SODIUM SERPL-SCNC: 140 MMOL/L — SIGNIFICANT CHANGE UP (ref 135–145)
WBC # BLD: 14.12 K/UL — HIGH (ref 3.8–10.5)
WBC # FLD AUTO: 14.12 K/UL — HIGH (ref 3.8–10.5)

## 2021-01-20 PROCEDURE — 63048 LAM FACETEC &FORAMOT EA ADDL: CPT | Mod: 62

## 2021-01-20 PROCEDURE — 63047 LAM FACETEC & FORAMOT LUMBAR: CPT | Mod: 62

## 2021-01-20 PROCEDURE — 22612 ARTHRD PST TQ 1NTRSPC LUMBAR: CPT | Mod: 62

## 2021-01-20 PROCEDURE — 22840 INSERT SPINE FIXATION DEVICE: CPT

## 2021-01-20 PROCEDURE — 22840 INSERT SPINE FIXATION DEVICE: CPT | Mod: 80

## 2021-01-20 RX ORDER — CYCLOBENZAPRINE HYDROCHLORIDE 10 MG/1
0 TABLET, FILM COATED ORAL
Qty: 0 | Refills: 0 | DISCHARGE

## 2021-01-20 RX ORDER — ATORVASTATIN CALCIUM 80 MG/1
40 TABLET, FILM COATED ORAL AT BEDTIME
Refills: 0 | Status: DISCONTINUED | OUTPATIENT
Start: 2021-01-20 | End: 2021-01-26

## 2021-01-20 RX ORDER — ROSUVASTATIN CALCIUM 5 MG/1
1 TABLET ORAL
Qty: 0 | Refills: 0 | DISCHARGE

## 2021-01-20 RX ORDER — CEFAZOLIN SODIUM 1 G
1000 VIAL (EA) INJECTION EVERY 8 HOURS
Refills: 0 | Status: COMPLETED | OUTPATIENT
Start: 2021-01-20 | End: 2021-01-21

## 2021-01-20 RX ORDER — ACETAMINOPHEN 500 MG
650 TABLET ORAL EVERY 6 HOURS
Refills: 0 | Status: DISCONTINUED | OUTPATIENT
Start: 2021-01-20 | End: 2021-01-21

## 2021-01-20 RX ORDER — CHOLECALCIFEROL (VITAMIN D3) 125 MCG
2000 CAPSULE ORAL DAILY
Refills: 0 | Status: DISCONTINUED | OUTPATIENT
Start: 2021-01-20 | End: 2021-01-26

## 2021-01-20 RX ORDER — FENTANYL CITRATE 50 UG/ML
25 INJECTION INTRAVENOUS
Refills: 0 | Status: DISCONTINUED | OUTPATIENT
Start: 2021-01-20 | End: 2021-01-20

## 2021-01-20 RX ORDER — CYCLOBENZAPRINE HYDROCHLORIDE 10 MG/1
5 TABLET, FILM COATED ORAL EVERY 12 HOURS
Refills: 0 | Status: DISCONTINUED | OUTPATIENT
Start: 2021-01-20 | End: 2021-01-26

## 2021-01-20 RX ORDER — SODIUM CHLORIDE 9 MG/ML
1000 INJECTION INTRAMUSCULAR; INTRAVENOUS; SUBCUTANEOUS
Refills: 0 | Status: DISCONTINUED | OUTPATIENT
Start: 2021-01-20 | End: 2021-01-21

## 2021-01-20 RX ORDER — OXYCODONE HYDROCHLORIDE 5 MG/1
5 TABLET ORAL EVERY 4 HOURS
Refills: 0 | Status: DISCONTINUED | OUTPATIENT
Start: 2021-01-20 | End: 2021-01-21

## 2021-01-20 RX ORDER — BUDESONIDE AND FORMOTEROL FUMARATE DIHYDRATE 160; 4.5 UG/1; UG/1
2 AEROSOL RESPIRATORY (INHALATION)
Refills: 0 | Status: DISCONTINUED | OUTPATIENT
Start: 2021-01-20 | End: 2021-01-22

## 2021-01-20 RX ORDER — URSODIOL 250 MG/1
300 TABLET, FILM COATED ORAL THREE TIMES A DAY
Refills: 0 | Status: DISCONTINUED | OUTPATIENT
Start: 2021-01-20 | End: 2021-01-26

## 2021-01-20 RX ORDER — LEVOTHYROXINE SODIUM 125 MCG
1 TABLET ORAL
Qty: 0 | Refills: 0 | DISCHARGE

## 2021-01-20 RX ORDER — LEVOTHYROXINE SODIUM 125 MCG
100 TABLET ORAL DAILY
Refills: 0 | Status: DISCONTINUED | OUTPATIENT
Start: 2021-01-20 | End: 2021-01-26

## 2021-01-20 RX ORDER — ONDANSETRON 8 MG/1
4 TABLET, FILM COATED ORAL ONCE
Refills: 0 | Status: DISCONTINUED | OUTPATIENT
Start: 2021-01-20 | End: 2021-01-20

## 2021-01-20 RX ORDER — MORPHINE SULFATE 50 MG/1
1 CAPSULE, EXTENDED RELEASE ORAL EVERY 4 HOURS
Refills: 0 | Status: DISCONTINUED | OUTPATIENT
Start: 2021-01-20 | End: 2021-01-21

## 2021-01-20 RX ORDER — HYDROMORPHONE HYDROCHLORIDE 2 MG/ML
0.5 INJECTION INTRAMUSCULAR; INTRAVENOUS; SUBCUTANEOUS
Refills: 0 | Status: DISCONTINUED | OUTPATIENT
Start: 2021-01-20 | End: 2021-01-20

## 2021-01-20 RX ORDER — ALBUTEROL 90 UG/1
2 AEROSOL, METERED ORAL
Refills: 0 | Status: DISCONTINUED | OUTPATIENT
Start: 2021-01-20 | End: 2021-01-26

## 2021-01-20 RX ORDER — MORPHINE SULFATE 50 MG/1
1 CAPSULE, EXTENDED RELEASE ORAL EVERY 4 HOURS
Refills: 0 | Status: DISCONTINUED | OUTPATIENT
Start: 2021-01-20 | End: 2021-01-20

## 2021-01-20 RX ORDER — SENNA PLUS 8.6 MG/1
2 TABLET ORAL AT BEDTIME
Refills: 0 | Status: DISCONTINUED | OUTPATIENT
Start: 2021-01-20 | End: 2021-01-26

## 2021-01-20 RX ORDER — OXYCODONE HYDROCHLORIDE 5 MG/1
10 TABLET ORAL EVERY 4 HOURS
Refills: 0 | Status: DISCONTINUED | OUTPATIENT
Start: 2021-01-20 | End: 2021-01-21

## 2021-01-20 RX ORDER — SODIUM CHLORIDE 9 MG/ML
1000 INJECTION, SOLUTION INTRAVENOUS
Refills: 0 | Status: DISCONTINUED | OUTPATIENT
Start: 2021-01-20 | End: 2021-01-20

## 2021-01-20 RX ADMIN — Medication 100 MILLIGRAM(S): at 14:45

## 2021-01-20 RX ADMIN — URSODIOL 300 MILLIGRAM(S): 250 TABLET, FILM COATED ORAL at 22:54

## 2021-01-20 RX ADMIN — CYCLOBENZAPRINE HYDROCHLORIDE 5 MILLIGRAM(S): 10 TABLET, FILM COATED ORAL at 20:10

## 2021-01-20 RX ADMIN — SENNA PLUS 2 TABLET(S): 8.6 TABLET ORAL at 22:56

## 2021-01-20 RX ADMIN — FENTANYL CITRATE 25 MICROGRAM(S): 50 INJECTION INTRAVENOUS at 20:14

## 2021-01-20 RX ADMIN — ATORVASTATIN CALCIUM 40 MILLIGRAM(S): 80 TABLET, FILM COATED ORAL at 21:40

## 2021-01-20 RX ADMIN — SODIUM CHLORIDE 50 MILLILITER(S): 9 INJECTION INTRAMUSCULAR; INTRAVENOUS; SUBCUTANEOUS at 22:54

## 2021-01-20 RX ADMIN — Medication 100 MILLIGRAM(S): at 21:40

## 2021-01-20 RX ADMIN — SODIUM CHLORIDE 75 MILLILITER(S): 9 INJECTION, SOLUTION INTRAVENOUS at 20:05

## 2021-01-20 RX ADMIN — OXYCODONE HYDROCHLORIDE 5 MILLIGRAM(S): 5 TABLET ORAL at 20:04

## 2021-01-20 RX ADMIN — OXYCODONE HYDROCHLORIDE 10 MILLIGRAM(S): 5 TABLET ORAL at 22:54

## 2021-01-20 RX ADMIN — FENTANYL CITRATE 25 MICROGRAM(S): 50 INJECTION INTRAVENOUS at 18:59

## 2021-01-20 NOTE — PROGRESS NOTE ADULT - SUBJECTIVE AND OBJECTIVE BOX
NSGY Attg:    Patient seen and examined.  No acute changes since last office visit.  Progressive LBP and bilateral LE pain.  No new weakness.    Exam:  A and O x 3  PERRL  EOMI  MARTINEZ to command  LE 5/5 bilaterally    The patient is declining re-explanation of the risks, benefits, and alternatives of surgical intervention as previously described and documented in the outpatient chart.  I have answered all her questions.  She wishes to proceed with surgical intervention.    A/P:  - to OR for L4-5 laminectomy/facectectomy/posterior instrumented fusion; possible L4-5 interbody

## 2021-01-20 NOTE — BRIEF OPERATIVE NOTE - NSICDXBRIEFPREOP_GEN_ALL_CORE_FT
PRE-OP DIAGNOSIS:  Spondylolisthesis at L4-L5 level 20-Jan-2021 14:12:28  Chandra Guerra  
PRE-OP DIAGNOSIS:  Spondylolisthesis at L4-L5 level 20-Jan-2021 14:12:28  Chandra Guerra

## 2021-01-20 NOTE — PROGRESS NOTE ADULT - SUBJECTIVE AND OBJECTIVE BOX
POST-OPERATIVE NOTE    Procedure: L4-L5 PLIF    Diagnosis/Indication: L4-5 listhesis with foraminal stenosis    Surgeon: Dr. Carrillo, assisted by Dr. Guerra    INTERVAL HPI/ACUTE EVENTS:  80yFemale PMH hypothyroidism, HLD, asthma, depression, DM type 2, admitted with L4-5 listhesis with foraminal stenosis now s/p L4-5 PLIF. POD#0. Patient seen lying comfortably in bed. Patient complaining of back pain in the recovery room. No other acute complaints. Post-op sedation weaning.    VITALS:  T(C): 36.6 (01-20-21 @ 18:15), Max: 36.6 (01-20-21 @ 18:15)  HR: 98 (01-20-21 @ 18:35) (82 - 101)  BP: 91/49 (01-20-21 @ 18:35) (87/46 - 135/68)  RR: 17 (01-20-21 @ 18:35) (16 - 18)  SpO2: 98% (01-20-21 @ 18:35) (98% - 98%)  Wt(kg): --    PHYSICAL EXAM:  GENERAL: NAD, well-groomed, well-developed  HEAD:  Normocephalic, atraumatic   DRAINS: Epidural drain to bulb suction. Serosanguinous drainage noted.  MENTAL STATUS: Awake; Opens eyes spontaneously; Appropriately conversant without aphasia; following simple commands  MOTOR: MARTINEZ to command; unable to assess motor/pronators due to post-op sedation weaning  SENSATION: unable to assess sensation due to post-op sedation weaning  EXTREMITIES:  2+ peripheral pulses, no clubbing, cyanosis, or edema  SKIN: Warm, dry; no rashes or lesions              RADIOLOGY/OTHER:  All outpatient imaging reviewed by Dr. Carrillo.

## 2021-01-20 NOTE — BRIEF OPERATIVE NOTE - NSICDXBRIEFPOSTOP_GEN_ALL_CORE_FT
POST-OP DIAGNOSIS:  Spondylolisthesis at L4-L5 level 20-Jan-2021 14:12:40  Chandra Guerra  
POST-OP DIAGNOSIS:  Spondylolisthesis at L4-L5 level 20-Jan-2021 14:12:40  Chandra Guerra

## 2021-01-20 NOTE — PROGRESS NOTE ADULT - ASSESSMENT
80yFemale PMH hypothyroidism, HLD, asthma, depression, DM type 2, admitted with L4-5 listhesis with foraminal stenosis now s/p L4-5 PLIF. POD#0.    Plan:  -D/w Dr. Carrillo  -Q4h neuro checks once out of PACU  -Bedrest with logroll permitted until LSO brace delivered  -LSO brace ordered; to be delivered in AM  -Remove Little at 6 AM  -CT L-spine in AM  -Pain control PRN; Tylenol, Oxycodone, Morphine for breakthrough  -PT/OT ordered  -Home meds ordered   -Monitor RUSSEL drain output  -Ancef x 3 doses  -CBC/BMP stat & then in AM, ordered

## 2021-01-20 NOTE — BRIEF OPERATIVE NOTE - NSICDXBRIEFPROCEDURE_GEN_ALL_CORE_FT
PROCEDURES:  Lumbar fusion 20-Jan-2021 14:12:18  Chandra Guerra  
PROCEDURES:  Lumbar fusion 20-Jan-2021 14:12:18  Chandra Guerra  
GRACIELA Zuñiga

## 2021-01-21 LAB
ANION GAP SERPL CALC-SCNC: 12 MMOL/L — SIGNIFICANT CHANGE UP (ref 5–17)
BASOPHILS # BLD AUTO: 0.02 K/UL — SIGNIFICANT CHANGE UP (ref 0–0.2)
BASOPHILS NFR BLD AUTO: 0.2 % — SIGNIFICANT CHANGE UP (ref 0–2)
BLD GP AB SCN SERPL QL: SIGNIFICANT CHANGE UP
BUN SERPL-MCNC: 14 MG/DL — SIGNIFICANT CHANGE UP (ref 8–20)
CALCIUM SERPL-MCNC: 8.6 MG/DL — SIGNIFICANT CHANGE UP (ref 8.6–10.2)
CHLORIDE SERPL-SCNC: 108 MMOL/L — HIGH (ref 98–107)
CO2 SERPL-SCNC: 19 MMOL/L — LOW (ref 22–29)
CREAT SERPL-MCNC: 0.56 MG/DL — SIGNIFICANT CHANGE UP (ref 0.5–1.3)
EOSINOPHIL # BLD AUTO: 0 K/UL — SIGNIFICANT CHANGE UP (ref 0–0.5)
EOSINOPHIL NFR BLD AUTO: 0 % — SIGNIFICANT CHANGE UP (ref 0–6)
GLUCOSE SERPL-MCNC: 112 MG/DL — HIGH (ref 70–99)
HCT VFR BLD CALC: 32.3 % — LOW (ref 34.5–45)
HGB BLD-MCNC: 10.5 G/DL — LOW (ref 11.5–15.5)
IMM GRANULOCYTES NFR BLD AUTO: 0.4 % — SIGNIFICANT CHANGE UP (ref 0–1.5)
LYMPHOCYTES # BLD AUTO: 0.87 K/UL — LOW (ref 1–3.3)
LYMPHOCYTES # BLD AUTO: 7.2 % — LOW (ref 13–44)
MAGNESIUM SERPL-MCNC: 1.8 MG/DL — SIGNIFICANT CHANGE UP (ref 1.6–2.6)
MCHC RBC-ENTMCNC: 32.5 GM/DL — SIGNIFICANT CHANGE UP (ref 32–36)
MCHC RBC-ENTMCNC: 33 PG — SIGNIFICANT CHANGE UP (ref 27–34)
MCV RBC AUTO: 101.6 FL — HIGH (ref 80–100)
MONOCYTES # BLD AUTO: 0.83 K/UL — SIGNIFICANT CHANGE UP (ref 0–0.9)
MONOCYTES NFR BLD AUTO: 6.9 % — SIGNIFICANT CHANGE UP (ref 2–14)
NEUTROPHILS # BLD AUTO: 10.3 K/UL — HIGH (ref 1.8–7.4)
NEUTROPHILS NFR BLD AUTO: 85.3 % — HIGH (ref 43–77)
PHOSPHATE SERPL-MCNC: 3.9 MG/DL — SIGNIFICANT CHANGE UP (ref 2.4–4.7)
PLATELET # BLD AUTO: 235 K/UL — SIGNIFICANT CHANGE UP (ref 150–400)
POTASSIUM SERPL-MCNC: 4.8 MMOL/L — SIGNIFICANT CHANGE UP (ref 3.5–5.3)
POTASSIUM SERPL-SCNC: 4.8 MMOL/L — SIGNIFICANT CHANGE UP (ref 3.5–5.3)
RBC # BLD: 3.18 M/UL — LOW (ref 3.8–5.2)
RBC # FLD: 13 % — SIGNIFICANT CHANGE UP (ref 10.3–14.5)
SODIUM SERPL-SCNC: 139 MMOL/L — SIGNIFICANT CHANGE UP (ref 135–145)
WBC # BLD: 12.07 K/UL — HIGH (ref 3.8–10.5)
WBC # FLD AUTO: 12.07 K/UL — HIGH (ref 3.8–10.5)

## 2021-01-21 PROCEDURE — 72131 CT LUMBAR SPINE W/O DYE: CPT | Mod: 26

## 2021-01-21 RX ORDER — HYDROMORPHONE HYDROCHLORIDE 2 MG/ML
1 INJECTION INTRAMUSCULAR; INTRAVENOUS; SUBCUTANEOUS EVERY 4 HOURS
Refills: 0 | Status: DISCONTINUED | OUTPATIENT
Start: 2021-01-21 | End: 2021-01-26

## 2021-01-21 RX ORDER — ACETAMINOPHEN 500 MG
650 TABLET ORAL EVERY 6 HOURS
Refills: 0 | Status: DISCONTINUED | OUTPATIENT
Start: 2021-01-21 | End: 2021-01-26

## 2021-01-21 RX ORDER — HYDROMORPHONE HYDROCHLORIDE 2 MG/ML
2 INJECTION INTRAMUSCULAR; INTRAVENOUS; SUBCUTANEOUS EVERY 4 HOURS
Refills: 0 | Status: DISCONTINUED | OUTPATIENT
Start: 2021-01-21 | End: 2021-01-26

## 2021-01-21 RX ADMIN — HYDROMORPHONE HYDROCHLORIDE 2 MILLIGRAM(S): 2 INJECTION INTRAMUSCULAR; INTRAVENOUS; SUBCUTANEOUS at 21:49

## 2021-01-21 RX ADMIN — CYCLOBENZAPRINE HYDROCHLORIDE 5 MILLIGRAM(S): 10 TABLET, FILM COATED ORAL at 05:14

## 2021-01-21 RX ADMIN — URSODIOL 300 MILLIGRAM(S): 250 TABLET, FILM COATED ORAL at 21:48

## 2021-01-21 RX ADMIN — OXYCODONE HYDROCHLORIDE 5 MILLIGRAM(S): 5 TABLET ORAL at 05:20

## 2021-01-21 RX ADMIN — Medication 100 MILLIGRAM(S): at 14:45

## 2021-01-21 RX ADMIN — ATORVASTATIN CALCIUM 40 MILLIGRAM(S): 80 TABLET, FILM COATED ORAL at 21:49

## 2021-01-21 RX ADMIN — Medication 100 MILLIGRAM(S): at 05:14

## 2021-01-21 RX ADMIN — HYDROMORPHONE HYDROCHLORIDE 1 MILLIGRAM(S): 2 INJECTION INTRAMUSCULAR; INTRAVENOUS; SUBCUTANEOUS at 16:19

## 2021-01-21 RX ADMIN — Medication 2000 UNIT(S): at 11:50

## 2021-01-21 RX ADMIN — OXYCODONE HYDROCHLORIDE 10 MILLIGRAM(S): 5 TABLET ORAL at 10:43

## 2021-01-21 RX ADMIN — SENNA PLUS 2 TABLET(S): 8.6 TABLET ORAL at 21:49

## 2021-01-21 RX ADMIN — URSODIOL 300 MILLIGRAM(S): 250 TABLET, FILM COATED ORAL at 15:32

## 2021-01-21 RX ADMIN — HYDROMORPHONE HYDROCHLORIDE 1 MILLIGRAM(S): 2 INJECTION INTRAMUSCULAR; INTRAVENOUS; SUBCUTANEOUS at 23:51

## 2021-01-21 RX ADMIN — CYCLOBENZAPRINE HYDROCHLORIDE 5 MILLIGRAM(S): 10 TABLET, FILM COATED ORAL at 18:04

## 2021-01-21 RX ADMIN — HYDROMORPHONE HYDROCHLORIDE 2 MILLIGRAM(S): 2 INJECTION INTRAMUSCULAR; INTRAVENOUS; SUBCUTANEOUS at 14:32

## 2021-01-21 RX ADMIN — BUDESONIDE AND FORMOTEROL FUMARATE DIHYDRATE 2 PUFF(S): 160; 4.5 AEROSOL RESPIRATORY (INHALATION) at 20:08

## 2021-01-21 RX ADMIN — Medication 100 MICROGRAM(S): at 05:14

## 2021-01-21 RX ADMIN — URSODIOL 300 MILLIGRAM(S): 250 TABLET, FILM COATED ORAL at 08:33

## 2021-01-21 NOTE — PROGRESS NOTE ADULT - SUBJECTIVE AND OBJECTIVE BOX
Lillian was awake and alert in upright position in bed as I measured, sized and fit her with an [place] LSO. She was instructed on donning and adjustment of the brace, and given printed instructions. She stated her pain reduced from 4 to 2/10 with the brace on, and preferred to keep it on during transport to CT scan.  [facility] [place]

## 2021-01-21 NOTE — PHYSICAL THERAPY INITIAL EVALUATION ADULT - GAIT TRAINING, PT EVAL
Time Frame:   1  days   Goal:  Maintain Modified Independent  with RW x  250  feet / Stairs: pt is independent with stairs

## 2021-01-21 NOTE — PROGRESS NOTE ADULT - SUBJECTIVE AND OBJECTIVE BOX
Ms. Osorio is postop day #1 status post an L4-L5 lumbar instrumented fusion for grade 2 spondylolisthesis.  Patient also had an L4-L5 lumbar laminectomy performed.  After review of nursing notes there has been no acute episodes in the last 24 hours/postop period.  Patient states she is comfortable states oxycodone is not controlling her pain adequately.  She states this happened during her knee replacement surgery as well I think it is reasonable to transfer this patient over to a Dilaudid type therapeutic regimen.  No fevers reported no neurologic syed motor deficits subjectively reported.  Physical exam.    Musculoskeletal examination demonstrates motor strength that is 5 out of 5 at knee extension hip flexion dorsiflexion and plantarflexion.  Neurologic exam does not demonstrate any acute sensory deficits.  Drain is patent with serosanguineous drainage as expected.  Head eyes ears nose and throat no vision changes.    Impression and plan.    Continue with postop protocol management physical therapy adjust pain medication as required.  Weightbearing as tolerated advance diet to regular anticipate discharge home in approximately 48 to 72 hours.

## 2021-01-21 NOTE — PHYSICAL THERAPY INITIAL EVALUATION ADULT - ADDITIONAL COMMENTS
Pt lives in a house with 3  steps to enter with 1 rails and 0  stairs inside.  Pt owns medical equipment: none   Pt lives with: spouse  Someone is always available to provide assist.

## 2021-01-21 NOTE — PROGRESS NOTE ADULT - SUBJECTIVE AND OBJECTIVE BOX
HPI: 79 y/o female with HLD, OA, Asthma, hypothyroidism, DM Type 2(diet control), Lower back pain, seen today pre-op for L4-L5 Posterior lumbar interbody fusion. Pt report longstanding  lumbar pain that has progressively gotten worse within the past one year. Report back pain worse on ambulation, climbing stairs, weight bearing activity and ADLs. Report pain radiates to bilateral lower extremities and buttocks, rate worse pain as 10/10. Pain relieved with Percocet, muscle relaxer and rest. Report prior conservative treatment with pain management, pt underwent steroid injections, physical therapy and  chiropractic treatment without any significant improvement.   Pt seen today for a scheduled surgery on 1/20/2021 with Dr. Carrillo.        (13 Jan 2021 08:11)      INTERVAL OVERNIGHT EVENTS: 1/21/21  80y Female s/p L4-L5 PLIF seen sitting comfortably in a chair. CT L Spine done this morning. Brace at bedside. Tolerating PO diet. Participated with physical therapy.     Vital Signs Last 24 Hrs  T(C): 36.6 (21 Jan 2021 07:42), Max: 36.6 (20 Jan 2021 18:15)  T(F): 97.9 (21 Jan 2021 07:42), Max: 97.9 (20 Jan 2021 18:15)  HR: 88 (21 Jan 2021 07:42) (82 - 101)  BP: 103/61 (21 Jan 2021 07:42) (87/46 - 125/76)  BP(mean): 86 (20 Jan 2021 21:30) (66 - 86)  RR: 18 (21 Jan 2021 07:42) (12 - 19)  SpO2: 91% (21 Jan 2021 07:42) (91% - 99%)    PHYSICAL EXAM:  GENERAL: NAD, well-groomed, well-developed female in NAD  HEAD:  Atraumatic, normocephalic  DRAINS: RUSSEL drain, thick bloody drainage  WOUND: Dressing clean dry intact  MENTAL STATUS: AAO x3;  Appropriately conversant without aphasia; following simple commands  CRANIAL NERVES: EOMI without nystagmus. Facial sensation intact V1-3 distribution b/l. Face symmetric w/ normal eye closure and smile, tongue midline. Hearing grossly intact. Speech clear. Head turning and shoulder shrug intact.   MOTOR: strength 5/5 b/l upper and lower extremities  SENSATION: grossly intact to light touch all extremities      LABS:                        10.5   12.07 )-----------( 235      ( 21 Jan 2021 08:12 )             32.3     01-21    139  |  108<H>  |  14.0  ----------------------------<  112<H>  4.8   |  19.0<L>  |  0.56    Ca    8.6      21 Jan 2021 08:12  Phos  3.9     01-21  Mg     1.8     01-21 01-20 @ 07:01 - 01-21 @ 07:00  --------------------------------------------------------  IN: 775 mL / OUT: 1325 mL / NET: -550 mL    01-21 @ 07:01 - 01-21 @ 11:51  --------------------------------------------------------  IN: 0 mL / OUT: 215 mL / NET: -215 mL        RADIOLOGY & ADDITIONAL TESTS:    CT Lumbar Spine No Cont (01.21.21 @ 09:30)       INTERPRETATION:  CLINICAL INFORMATION: s/p PLIF. s/p PLIF. ADMDIAG1: M48.062 SPINAL STENOSIS, LUMBAR REGION/.    TECHNIQUE: Noncontrast CT scan of the lumbar spine was performed. Thin section axial images were obtained with sagittal and coronal reformations.    COMPARISON: Imported MRI lumbar spine 12/4/2020.    FINDINGS:    Grade 2 anterolisthesis at L4-L5. Posterior spinal fusion and laminectomies at L4-L5. Tip of the left L5 transpedicular screw protrudes 3 mm anterior to the vertebral body cortex, in the vicinity of the left common iliac vein (4:186). A drainage catheter in the posterior paraspinal soft tissues with tip at the level of L3.    Grade 1 retrolisthesis at T12-L1 and L1-L2. Chronic loss of height of the superior L2 and L3 endplates. Remaining lumbar vertebral body heights are within normal limits. Multilevel intervertebral disc height loss with vacuum phenomenon.    Cholecystectomy. Colonic diverticulosis.      IMPRESSION: Posterior spinal fusion and laminectomies at L4-L5.            CAPRINI SCORE [CLOT]:  Patient has an estimated Caprini score of greater than 5.  However, the patient's unique clinical situation will be addressed in an individual manner to determine appropriate anticoagulation treatment, if any.

## 2021-01-21 NOTE — PROGRESS NOTE ADULT - ASSESSMENT
79 y/o female POD #1 from a 1 level PLIF at L4-L5    1. CT  L spine done this morning  2. Ok to safely get OOB, participate with PT & ambulate  3. Brace must be on at ALL times when OOB, may remove when in bed  4. Oxycodone removed as this medication does not provide pain relief. Dilauded 2 mg Q 4 hrs ordered.   5. Cont to record RUSSEL output  6. Encourage Incentive spirometer   7. Mechanical DVT prophylaxis  8. AM labs 1/22/21

## 2021-01-22 DIAGNOSIS — Z98.1 ARTHRODESIS STATUS: ICD-10-CM

## 2021-01-22 LAB
ANION GAP SERPL CALC-SCNC: 12 MMOL/L — SIGNIFICANT CHANGE UP (ref 5–17)
BASOPHILS # BLD AUTO: 0.06 K/UL — SIGNIFICANT CHANGE UP (ref 0–0.2)
BASOPHILS NFR BLD AUTO: 0.4 % — SIGNIFICANT CHANGE UP (ref 0–2)
BUN SERPL-MCNC: 25 MG/DL — HIGH (ref 8–20)
CALCIUM SERPL-MCNC: 9.3 MG/DL — SIGNIFICANT CHANGE UP (ref 8.6–10.2)
CHLORIDE SERPL-SCNC: 101 MMOL/L — SIGNIFICANT CHANGE UP (ref 98–107)
CO2 SERPL-SCNC: 20 MMOL/L — LOW (ref 22–29)
CREAT SERPL-MCNC: 0.89 MG/DL — SIGNIFICANT CHANGE UP (ref 0.5–1.3)
EOSINOPHIL # BLD AUTO: 0.08 K/UL — SIGNIFICANT CHANGE UP (ref 0–0.5)
EOSINOPHIL NFR BLD AUTO: 0.6 % — SIGNIFICANT CHANGE UP (ref 0–6)
GLUCOSE SERPL-MCNC: 96 MG/DL — SIGNIFICANT CHANGE UP (ref 70–99)
HCT VFR BLD CALC: 34.2 % — LOW (ref 34.5–45)
HGB BLD-MCNC: 11.2 G/DL — LOW (ref 11.5–15.5)
IMM GRANULOCYTES NFR BLD AUTO: 0.6 % — SIGNIFICANT CHANGE UP (ref 0–1.5)
LYMPHOCYTES # BLD AUTO: 18.3 % — SIGNIFICANT CHANGE UP (ref 13–44)
LYMPHOCYTES # BLD AUTO: 2.61 K/UL — SIGNIFICANT CHANGE UP (ref 1–3.3)
MCHC RBC-ENTMCNC: 32.7 GM/DL — SIGNIFICANT CHANGE UP (ref 32–36)
MCHC RBC-ENTMCNC: 33.4 PG — SIGNIFICANT CHANGE UP (ref 27–34)
MCV RBC AUTO: 102.1 FL — HIGH (ref 80–100)
MONOCYTES # BLD AUTO: 1.79 K/UL — HIGH (ref 0–0.9)
MONOCYTES NFR BLD AUTO: 12.5 % — SIGNIFICANT CHANGE UP (ref 2–14)
NEUTROPHILS # BLD AUTO: 9.66 K/UL — HIGH (ref 1.8–7.4)
NEUTROPHILS NFR BLD AUTO: 67.6 % — SIGNIFICANT CHANGE UP (ref 43–77)
PLATELET # BLD AUTO: 204 K/UL — SIGNIFICANT CHANGE UP (ref 150–400)
POTASSIUM SERPL-MCNC: 4.5 MMOL/L — SIGNIFICANT CHANGE UP (ref 3.5–5.3)
POTASSIUM SERPL-SCNC: 4.5 MMOL/L — SIGNIFICANT CHANGE UP (ref 3.5–5.3)
PREALB SERPL-MCNC: 13 MG/DL — LOW (ref 18–38)
RBC # BLD: 3.35 M/UL — LOW (ref 3.8–5.2)
RBC # FLD: 13 % — SIGNIFICANT CHANGE UP (ref 10.3–14.5)
SODIUM SERPL-SCNC: 132 MMOL/L — LOW (ref 135–145)
WBC # BLD: 14.28 K/UL — HIGH (ref 3.8–10.5)
WBC # FLD AUTO: 14.28 K/UL — HIGH (ref 3.8–10.5)

## 2021-01-22 RX ORDER — BUDESONIDE AND FORMOTEROL FUMARATE DIHYDRATE 160; 4.5 UG/1; UG/1
2 AEROSOL RESPIRATORY (INHALATION)
Refills: 0 | Status: DISCONTINUED | OUTPATIENT
Start: 2021-01-22 | End: 2021-01-26

## 2021-01-22 RX ORDER — MINERAL OIL
133 OIL (ML) MISCELLANEOUS DAILY
Refills: 0 | Status: DISCONTINUED | OUTPATIENT
Start: 2021-01-22 | End: 2021-01-26

## 2021-01-22 RX ORDER — SODIUM CHLORIDE 9 MG/ML
1000 INJECTION, SOLUTION INTRAVENOUS
Refills: 0 | Status: DISCONTINUED | OUTPATIENT
Start: 2021-01-22 | End: 2021-01-22

## 2021-01-22 RX ORDER — ASCORBIC ACID 60 MG
500 TABLET,CHEWABLE ORAL DAILY
Refills: 0 | Status: DISCONTINUED | OUTPATIENT
Start: 2021-01-22 | End: 2021-01-26

## 2021-01-22 RX ORDER — MULTIVIT-MIN/FERROUS GLUCONATE 9 MG/15 ML
1 LIQUID (ML) ORAL DAILY
Refills: 0 | Status: DISCONTINUED | OUTPATIENT
Start: 2021-01-22 | End: 2021-01-26

## 2021-01-22 RX ORDER — POLYETHYLENE GLYCOL 3350 17 G/17G
17 POWDER, FOR SOLUTION ORAL DAILY
Refills: 0 | Status: DISCONTINUED | OUTPATIENT
Start: 2021-01-22 | End: 2021-01-26

## 2021-01-22 RX ADMIN — URSODIOL 300 MILLIGRAM(S): 250 TABLET, FILM COATED ORAL at 05:42

## 2021-01-22 RX ADMIN — URSODIOL 300 MILLIGRAM(S): 250 TABLET, FILM COATED ORAL at 15:43

## 2021-01-22 RX ADMIN — HYDROMORPHONE HYDROCHLORIDE 2 MILLIGRAM(S): 2 INJECTION INTRAMUSCULAR; INTRAVENOUS; SUBCUTANEOUS at 15:43

## 2021-01-22 RX ADMIN — HYDROMORPHONE HYDROCHLORIDE 1 MILLIGRAM(S): 2 INJECTION INTRAMUSCULAR; INTRAVENOUS; SUBCUTANEOUS at 23:00

## 2021-01-22 RX ADMIN — URSODIOL 300 MILLIGRAM(S): 250 TABLET, FILM COATED ORAL at 21:37

## 2021-01-22 RX ADMIN — POLYETHYLENE GLYCOL 3350 17 GRAM(S): 17 POWDER, FOR SOLUTION ORAL at 11:56

## 2021-01-22 RX ADMIN — HYDROMORPHONE HYDROCHLORIDE 2 MILLIGRAM(S): 2 INJECTION INTRAMUSCULAR; INTRAVENOUS; SUBCUTANEOUS at 09:17

## 2021-01-22 RX ADMIN — ATORVASTATIN CALCIUM 40 MILLIGRAM(S): 80 TABLET, FILM COATED ORAL at 21:37

## 2021-01-22 RX ADMIN — Medication 100 MICROGRAM(S): at 05:42

## 2021-01-22 RX ADMIN — HYDROMORPHONE HYDROCHLORIDE 2 MILLIGRAM(S): 2 INJECTION INTRAMUSCULAR; INTRAVENOUS; SUBCUTANEOUS at 21:37

## 2021-01-22 RX ADMIN — Medication 2000 UNIT(S): at 11:57

## 2021-01-22 RX ADMIN — BUDESONIDE AND FORMOTEROL FUMARATE DIHYDRATE 2 PUFF(S): 160; 4.5 AEROSOL RESPIRATORY (INHALATION) at 08:32

## 2021-01-22 RX ADMIN — CYCLOBENZAPRINE HYDROCHLORIDE 5 MILLIGRAM(S): 10 TABLET, FILM COATED ORAL at 05:42

## 2021-01-22 RX ADMIN — SENNA PLUS 2 TABLET(S): 8.6 TABLET ORAL at 21:37

## 2021-01-22 RX ADMIN — CYCLOBENZAPRINE HYDROCHLORIDE 5 MILLIGRAM(S): 10 TABLET, FILM COATED ORAL at 17:19

## 2021-01-22 NOTE — PROGRESS NOTE ADULT - ASSESSMENT
79 y/o female POD #2 from a 1 level PLIF at L4-L5, recovering well  improved back and leg pain with weight bearing and ambulating w LSO brace   pain well controlled   RUSSEL drain in place and draining  CT reviewed by Dr Carrillo -- good placement of instrumentation  PT: home w OP PT/ independent as per OT assessment     1. will add additional bowel regiment   2. Ok to safely get OOB, participate with PT & ambulate  3. Brace must be on at ALL times when OOB, may remove when in bed  4. cont w PRN Dilauded 2 mg Q 4 hrs    5. cont w drain output monitoring, if output is low over the next 24 hrs, will plan to remove and d/c pt home   6. Encourage Incentive spirometer   7. Mechanical DVT prophylaxis  8. AM labs 1/23/21  9. RW prior to DX/ CM ordered & OP PT Rx provided   10. home meds reinstated

## 2021-01-22 NOTE — PROGRESS NOTE ADULT - SUBJECTIVE AND OBJECTIVE BOX
NEUROSURGERY PROGRESS NOTE:    HPI: 81 y/o female with HLD, OA, Asthma, hypothyroidism, DM Type 2(diet control), Lower back pain, seen today pre-op for L4-L5 Posterior lumbar interbody fusion. Pt report longstanding  lumbar pain that has progressively gotten worse within the past one year. Report back pain worse on ambulation, climbing stairs, weight bearing activity and ADLs. Report pain radiates to bilateral lower extremities and buttocks, rate worse pain as 10/10. Pain relieved with Percocet, muscle relaxer and rest. Report prior conservative treatment with pain management, pt underwent steroid injections, physical therapy and  chiropractic treatment without any significant improvement.   Pt seen today for a scheduled surgery on 1/20/2021 with Dr. Carrillo. (13 Jan 2021 08:11)      INTERVAL OVERNIGHT EVENTS: 1/21/21  80y Female s/p L4-L5 PLIF seen sitting comfortably in a chair w LSO Brace. Tolerating PO diet. Participated with physical therapy.   pt seen and states is at baseline, denied any new or worsening sensorimotor changes   -headache  - muscle spasms reported   + IS 10x/hr while awake   3/10 LBP/ incisional pain at rest   8/10 LBP/ incisional pain w movement/ positional   - Nausea / - Vomiting/ + hiccups   denies weakness  denies numbness/ tingling  denies visual changes  denies C/T Spine pain  + void  - BM, last BM Tuesday morning   + OOB w assist   + diet  - venodynes b/l when in bed   + RUSSEL drain, 1/21 245cc AM & 120cc PM shift, 25cc since 7 AM today and is serosanguinous         MEDICATIONS  (STANDING):  atorvastatin 40 milliGRAM(s) Oral at bedtime  budesonide 160 MICROgram(s)/formoterol 4.5 MICROgram(s) Inhaler 2 Puff(s) Inhalation two times a day  cholecalciferol 2000 Unit(s) Oral daily  cyclobenzaprine 5 milliGRAM(s) Oral every 12 hours  levothyroxine 100 MICROGram(s) Oral daily  polyethylene glycol 3350 17 Gram(s) Oral daily  senna 2 Tablet(s) Oral at bedtime  ursodiol Capsule 300 milliGRAM(s) Oral three times a day    MEDICATIONS  (PRN):  acetaminophen   Tablet .. 650 milliGRAM(s) Oral every 6 hours PRN Mild Pain (1 - 3)  ALBUTerol  90 MICROgram(s) HFA Inhaler - Peds 2 Puff(s) Inhalation four times a day PRN Shortness of Breath and/or Wheezing  artificial  tears Solution 1 Drop(s) Both EYES three times a day PRN Dry Eyes  HYDROmorphone   Tablet 2 milliGRAM(s) Oral every 4 hours PRN Moderate Pain (4 - 6)  HYDROmorphone  Injectable 1 milliGRAM(s) IV Push every 4 hours PRN Breakthrough Pain    Allergies    tetracycline (Hives)    Intolerances      Vital Signs Last 24 Hrs  T(C): 37.2 (22 Jan 2021 08:14), Max: 37.2 (22 Jan 2021 08:14)  T(F): 98.9 (22 Jan 2021 08:14), Max: 98.9 (22 Jan 2021 08:14)  HR: 101 (22 Jan 2021 08:32) (97 - 101)  BP: 110/74 (22 Jan 2021 08:14) (104/76 - 133/75)  BP(mean): --  RR: 18 (22 Jan 2021 08:14) (18 - 18)  SpO2: 97% (22 Jan 2021 08:32) (91% - 97%)        PHYSICAL EXAM:  GENERAL: NAD, well-groomed, well-developed, AAOx3 and very cooperative  HEAD: Atraumatic, Normocephalic, no palpable step-off appreciated on palpation  EYES: b/l EOMI, PERRL, conjunctiva and sclera clear,   NECK: Supple, nontender to palpation  + FROM w no muscular soreness reported,   TS:  nontender to palpation midline or paraspinal muscles b/l,  LS: + incisional lower midline pain on palpation dressing is C/D/I w RUSSEL drain, minimal drainage noted at the drain exit site   NERVOUS SYSTEM:  Alert & Oriented X3, speech is clear and fluent, no dysarthria appreciated. Good concentration & very cooperative; Motor Strength 5/5 B/L upper and lower extremities, sensory is at baseline and symmetric b/l; No pronators or ankle clonus appreciated b/l, cerebellar signs grossly intact b/l. CN II-XII grossly intact b/l and symmetric; no rizvi's b/l appreciated.   EXTREMITIES:  2+ Peripheral Pulses, No clubbing, cyanosis, + b/l foot/ankle edema at baseline/ pt states she wears compression stockings at all times,   CHEST/LUNG: Clear to auscultation bilaterally; No rales, rhonchi, wheezing, or rubs  HEART: Regular rate and rhythm; +S1 & +S2  ABDOMEN: Soft, Nontender, Nondistended; Bowel sounds present  LYMPH: No lymphadenopathy noted  SKIN: No rashes or lesions        LABS:                        11.2   14.28 )-----------( 204      ( 22 Jan 2021 06:15 )             34.2     01-22    132<L>  |  101  |  25.0<H>  ----------------------------<  96  4.5   |  20.0<L>  |  0.89    Ca    9.3      22 Jan 2021 06:15  Phos  3.9     01-21  Mg     1.8     01-21        RADIOLOGY & ADDITIONAL TESTS:    < from: CT Lumbar Spine No Cont (01.21.21 @ 09:30) >  INTERPRETATION:  CLINICAL INFORMATION: s/p PLIF. s/p PLIF. ADMDIAG1: M48.062 SPINAL STENOSIS, LUMBAR REGION/.  TECHNIQUE: Noncontrast CT scan of the lumbar spine was performed. Thin section axial images were obtained with sagittal and coronal reformations.  COMPARISON: Imported MRI lumbar spine 12/4/2020.    FINDINGS:  Grade 2 anterolisthesis at L4-L5. Posterior spinal fusion and laminectomies at L4-L5. Tip of the left L5 transpedicular screw protrudes 3 mm anterior to the vertebral body cortex, in the vicinity of the left common iliac vein (4:186). A drainage catheter in the posterior paraspinal soft tissues with tip at the level of L3.  Grade 1 retrolisthesis at T12-L1 and L1-L2. Chronic loss of height of the superior L2 and L3 endplates. Remaining lumbar vertebral body heights are within normal limits. Multilevel intervertebral disc height loss with vacuum phenomenon.  Cholecystectomy. Colonic diverticulosis.    IMPRESSION: Posterior spinal fusion and laminectomies at L4-L5.  ALLAN WELLS MD; Attending Radiologist  This document has been electronically signed. Jan 21 2021 10:33AM  < end of copied text >        Time Spent with patient/ education: > 25 mins arranging care and discussing plan w pt/ RN/ CM

## 2021-01-22 NOTE — PROGRESS NOTE ADULT - NUTRITIONAL ASSESSMENT
Diet, Consistent Carbohydrate w/Evening Snack (01-20-21 @ 18:59) [Active]  Diet, Clear Liquid (01-20-21 @ 18:34) [Available for Activation]    daily weights   MVI/ minerals and Vit C  pre-albumin pending    BMI 29.9  pt at goal

## 2021-01-23 LAB
ANION GAP SERPL CALC-SCNC: 11 MMOL/L — SIGNIFICANT CHANGE UP (ref 5–17)
BASOPHILS # BLD AUTO: 0.02 K/UL — SIGNIFICANT CHANGE UP (ref 0–0.2)
BASOPHILS NFR BLD AUTO: 0.2 % — SIGNIFICANT CHANGE UP (ref 0–2)
BUN SERPL-MCNC: 15 MG/DL — SIGNIFICANT CHANGE UP (ref 8–20)
CALCIUM SERPL-MCNC: 8.8 MG/DL — SIGNIFICANT CHANGE UP (ref 8.6–10.2)
CHLORIDE SERPL-SCNC: 103 MMOL/L — SIGNIFICANT CHANGE UP (ref 98–107)
CO2 SERPL-SCNC: 23 MMOL/L — SIGNIFICANT CHANGE UP (ref 22–29)
CREAT SERPL-MCNC: 0.56 MG/DL — SIGNIFICANT CHANGE UP (ref 0.5–1.3)
EOSINOPHIL # BLD AUTO: 0.14 K/UL — SIGNIFICANT CHANGE UP (ref 0–0.5)
EOSINOPHIL NFR BLD AUTO: 1.3 % — SIGNIFICANT CHANGE UP (ref 0–6)
GLUCOSE SERPL-MCNC: 136 MG/DL — HIGH (ref 70–99)
HCT VFR BLD CALC: 27.2 % — LOW (ref 34.5–45)
HGB BLD-MCNC: 9.1 G/DL — LOW (ref 11.5–15.5)
IMM GRANULOCYTES NFR BLD AUTO: 0.6 % — SIGNIFICANT CHANGE UP (ref 0–1.5)
LYMPHOCYTES # BLD AUTO: 1.85 K/UL — SIGNIFICANT CHANGE UP (ref 1–3.3)
LYMPHOCYTES # BLD AUTO: 17.2 % — SIGNIFICANT CHANGE UP (ref 13–44)
MCHC RBC-ENTMCNC: 32.7 PG — SIGNIFICANT CHANGE UP (ref 27–34)
MCHC RBC-ENTMCNC: 33.5 GM/DL — SIGNIFICANT CHANGE UP (ref 32–36)
MCV RBC AUTO: 97.8 FL — SIGNIFICANT CHANGE UP (ref 80–100)
MONOCYTES # BLD AUTO: 1.3 K/UL — HIGH (ref 0–0.9)
MONOCYTES NFR BLD AUTO: 12.1 % — SIGNIFICANT CHANGE UP (ref 2–14)
NEUTROPHILS # BLD AUTO: 7.4 K/UL — SIGNIFICANT CHANGE UP (ref 1.8–7.4)
NEUTROPHILS NFR BLD AUTO: 68.6 % — SIGNIFICANT CHANGE UP (ref 43–77)
PLATELET # BLD AUTO: 202 K/UL — SIGNIFICANT CHANGE UP (ref 150–400)
POTASSIUM SERPL-MCNC: 4 MMOL/L — SIGNIFICANT CHANGE UP (ref 3.5–5.3)
POTASSIUM SERPL-SCNC: 4 MMOL/L — SIGNIFICANT CHANGE UP (ref 3.5–5.3)
RBC # BLD: 2.78 M/UL — LOW (ref 3.8–5.2)
RBC # FLD: 12.8 % — SIGNIFICANT CHANGE UP (ref 10.3–14.5)
SODIUM SERPL-SCNC: 137 MMOL/L — SIGNIFICANT CHANGE UP (ref 135–145)
WBC # BLD: 10.77 K/UL — HIGH (ref 3.8–10.5)
WBC # FLD AUTO: 10.77 K/UL — HIGH (ref 3.8–10.5)

## 2021-01-23 RX ADMIN — URSODIOL 300 MILLIGRAM(S): 250 TABLET, FILM COATED ORAL at 04:54

## 2021-01-23 RX ADMIN — Medication 2000 UNIT(S): at 11:23

## 2021-01-23 RX ADMIN — HYDROMORPHONE HYDROCHLORIDE 2 MILLIGRAM(S): 2 INJECTION INTRAMUSCULAR; INTRAVENOUS; SUBCUTANEOUS at 11:24

## 2021-01-23 RX ADMIN — Medication 100 MICROGRAM(S): at 04:54

## 2021-01-23 RX ADMIN — URSODIOL 300 MILLIGRAM(S): 250 TABLET, FILM COATED ORAL at 13:40

## 2021-01-23 RX ADMIN — Medication 500 MILLIGRAM(S): at 11:24

## 2021-01-23 RX ADMIN — CYCLOBENZAPRINE HYDROCHLORIDE 5 MILLIGRAM(S): 10 TABLET, FILM COATED ORAL at 04:54

## 2021-01-23 RX ADMIN — HYDROMORPHONE HYDROCHLORIDE 1 MILLIGRAM(S): 2 INJECTION INTRAMUSCULAR; INTRAVENOUS; SUBCUTANEOUS at 22:44

## 2021-01-23 RX ADMIN — HYDROMORPHONE HYDROCHLORIDE 2 MILLIGRAM(S): 2 INJECTION INTRAMUSCULAR; INTRAVENOUS; SUBCUTANEOUS at 04:57

## 2021-01-23 RX ADMIN — SENNA PLUS 2 TABLET(S): 8.6 TABLET ORAL at 21:23

## 2021-01-23 RX ADMIN — URSODIOL 300 MILLIGRAM(S): 250 TABLET, FILM COATED ORAL at 21:23

## 2021-01-23 RX ADMIN — HYDROMORPHONE HYDROCHLORIDE 2 MILLIGRAM(S): 2 INJECTION INTRAMUSCULAR; INTRAVENOUS; SUBCUTANEOUS at 21:26

## 2021-01-23 RX ADMIN — POLYETHYLENE GLYCOL 3350 17 GRAM(S): 17 POWDER, FOR SOLUTION ORAL at 11:24

## 2021-01-23 RX ADMIN — CYCLOBENZAPRINE HYDROCHLORIDE 5 MILLIGRAM(S): 10 TABLET, FILM COATED ORAL at 17:27

## 2021-01-23 RX ADMIN — Medication 1 TABLET(S): at 11:23

## 2021-01-23 RX ADMIN — ATORVASTATIN CALCIUM 40 MILLIGRAM(S): 80 TABLET, FILM COATED ORAL at 21:23

## 2021-01-23 NOTE — PROGRESS NOTE ADULT - ASSESSMENT
This is 80 year old right hand female with HLD, OA, Asthma, hypothyroidism, DM Type 2, Lower back pain, POD# 3 status post L4-L5 Posterior lumbar interbody fusion.    1. Continue physical therapy   2. Tylenol for pain  3. Continue to monitor and record RUSSEL drainage  4. Pneumatic compression device to lower extremities while in bed   5. Plan was discussed with Dr Rickey Carrillo

## 2021-01-23 NOTE — PROGRESS NOTE ADULT - SUBJECTIVE AND OBJECTIVE BOX
Neurosurgery [facility]  Daily note     This is 80 year old right hand female with HLD, OA, Asthma, hypothyroidism, DM Type 2(diet control), Lower back pain, seen today pre-op for L4-L5 Posterior lumbar interbody fusion. Pt report longstanding  lumbar pain that has progressively gotten worse within the past one year. Report back pain worse on ambulation, climbing stairs, weight bearing activity and ADLs. Report pain radiates to bilateral lower extremities and buttocks, rate worse pain as 10/10. Pain relieved with Percocet, muscle relaxer and rest. Report prior conservative treatment with pain management, pt underwent steroid injections, physical therapy and  chiropractic treatment without any significant improvement.   Pt seen today for a scheduled surgery on 1/20/2021 with Dr. Carrillo.        (13 Jan 2021 08:11)      INTERVAL HPI/OVERNIGHT EVENTS:  80y Female s/p __ seen lying comfortably in bed. Tolerating diet. Passing gas/BM. Voiding. Little in with __ clear urine. Denies headache, weakness, numbness, n/v/d, fevers, chills, chest pain, SOB.     Vital Signs Last 24 Hrs  T(C): 36.6 (23 Jan 2021 08:40), Max: 36.8 (22 Jan 2021 20:01)  T(F): 97.9 (23 Jan 2021 08:40), Max: 98.3 (22 Jan 2021 23:24)  HR: 101 (23 Jan 2021 08:40) (98 - 102)  BP: 119/74 (23 Jan 2021 08:40) (106/65 - 119/74)  BP(mean): --  RR: 18 (23 Jan 2021 08:40) (18 - 18)  SpO2: 96% (23 Jan 2021 08:40) (92% - 96%)    PHYSICAL EXAM:  GENERAL: NAD, well-groomed, well-developed  HEAD:  Atraumatic, normocephalic  DRAINS:   WOUND: Dressing clean dry intact  JACKELINE COMA SCORE: E- V- M- =       E: 4= opens eyes spontaneously 3= to voice 2= to noxious 1= no opening       V: 5= oriented 4= confused 3= inappropriate words 2= incomprehensible sounds 1= nonverbal 1T= intubated       M: 6= follows commands 5= localizes 4= withdraws 3= flexor posturing 2= extensor posturing 1= no movement  MENTAL STATUS: AAO x3; Awake/Comatose; Opens eyes spontaneously/to voice/to light touch/to noxious stimuli; Appropriately conversant without aphasia/Nonverbal; following simple commands/mimicking/not following commands  CRANIAL NERVES: Visual acuity normal for age, visual fields full to confrontation, PERRL. EOMI without nystagmus. Facial sensation intact V1-3 distribution b/l. Face symmetric w/ normal eye closure and smile, tongue midline. Hearing grossly intact. Speech clear. Head turning and shoulder shrug intact.   REFLEXES: Doll's eyes positive. Blinks to threat. Gag reflex intact. No pronator drift; DTRs 2+ intact and symmetric; negative Crystal's b/l; negative clonus b/l  MOTOR: strength 5/5 b/l upper and lower extremities  Uppers     Delt     Bicep     Tricep     HG  R                5/5       5/5         5/5         5/5  L                5/5       5/5         5/5         5/5  Lowers      HF     KF      KE     PF     DF     EHL  R             5/5     5/5     5/5    5/5   5/5    5/5  L              5/5    5/5      5/5    5/5   5/5    5/5  SENSATION: grossly intact to light touch all extremities  COORDINATION: Gait intact; rapid alternating movements intact; heel to shin intact; no upper extremity dysmetria  CHEST/LUNG: Clear to auscultation bilaterally; no rales, rhonchi, wheezing, or rubs  HEART: +S1/+S2; Regular rate and rhythm; no murmurs, rubs, or gallops  ABDOMEN: Soft, nontender, nondistended; bowel sounds present all four quadrants  EXTREMITIES:  2+ peripheral pulses, no clubbing, cyanosis, or edema  SKIN: Warm, dry; no rashes or lesions    LABS:                        9.1    10.77 )-----------( 202      ( 23 Jan 2021 08:07 )             27.2     01-23    137  |  103  |  15.0  ----------------------------<  136<H>  4.0   |  23.0  |  0.56    Ca    8.8      23 Jan 2021 08:07            01-22 @ 07:01  -  01-23 @ 07:00  --------------------------------------------------------  IN: 0 mL / OUT: 2110 mL / NET: -2110 mL    01-23 @ 07:01  -  01-23 @ 13:08  --------------------------------------------------------  IN: 0 mL / OUT: 45 mL / NET: -45 mL        RADIOLOGY & ADDITIONAL TESTS: Neurosurgery [facility]  Daily note     This is 80 year old right hand female with HLD, OA, Asthma, hypothyroidism, DM Type 2, Lower back pain, POD# 3 status post L4-L5 Posterior lumbar interbody fusion. Pt report longstanding lumbar pain that has progressively gotten worse within the past one year. Report back pain worse on ambulation, climbing stairs, weight bearing activity and ADLs. Report pain radiates to bilateral lower extremities and buttocks, rate worse pain as 10/10. Pain relieved with Percocet, muscle relaxer and rest. Report prior conservative treatment with pain management, pt underwent steroid injections, physical therapy and  chiropractic treatment without any significant improvement. Patient seen today just completing a physical therapy session, resting in a chair.        INTERVAL HPI OVERNIGHT EVENTS:  80 year old right hand dominant female s/post L4 L5 PLIF, seen sitting in a chair. Tolerating diet. Patient denies headache, weakness, numbness, n/v/d, fevers, chills, chest pain, SOB.     Vital Signs Last 24 Hrs  T(C): 36.6 (23 Jan 2021 08:40), Max: 36.8 (22 Jan 2021 20:01)  T(F): 97.9 (23 Jan 2021 08:40), Max: 98.3 (22 Jan 2021 23:24)  HR: 101 (23 Jan 2021 08:40) (98 - 102)  BP: 119/74 (23 Jan 2021 08:40) (106/65 - 119/74)  RR: 18 (23 Jan 2021 08:40) (18 - 18)  SpO2: 96% (23 Jan 2021 08:40) (92% - 96%)    PHYSICAL EXAM:  GENERAL: NAD, well-groomed, well-developed  HEAD:  Atraumatic, normocephalic  WOUND: Dressing clean dry intact, RUSSEL drain in place   MENTAL STATUS: A A O x3, Appropriately conversant, following simple commands  CRANIAL NERVES: PERRL. EOMI without nystagmus. Facial sensation intact V1-3 distribution b/l. Tongue midline. Hearing grossly intact. Speech clear. Head turning and shoulder shrug intact.   MOTOR: strength 5/5 b/l upper and lower extremities  SENSATION: grossly intact to light touch all extremities  CHEST/LUNG: Clear to auscultation bilaterally  HEART: +S1/+S2  ABDOMEN: Soft, nontender, nondistended  EXTREMITIES:  2+ peripheral pulses, no clubbing, cyanosis, or edema  SKIN: Warm, dry; no rashes or lesions    LABS:                        9.1    10.77 )-----------( 202      ( 23 Jan 2021 08:07 )             27.2     01-23    137  |  103  |  15.0  ----------------------------<  136<H>  4.0   |  23.0  |  0.56    Ca    8.8      23 Jan 2021 08:07    01-22 @ 07:01 - 01-23 @ 07:00  --------------------------------------------------------  IN: 0 mL / OUT: 2110 mL / NET: -2110 mL    01-23 @ 07:01 - 01-23 @ 13:08  --------------------------------------------------------  IN: 0 mL / OUT: 45 mL / NET: -45 mL      RADIOLOGY & ADDITIONAL TESTS:  EXAM:  CT LUMBAR SPINE                        PROCEDURE DATE:  01/21/2021    INTERPRETATION:  CLINICAL INFORMATION: s/p PLIF. s/p PLIF. ADMDIAG1: M48.062 SPINAL STENOSIS, LUMBAR REGION/.    TECHNIQUE: Noncontrast CT scan of the lumbar spine was performed. Thin section axial images were obtained with sagittal and coronal reformations.    COMPARISON: Imported MRI lumbar spine 12/4/2020.    FINDINGS:    Grade 2 anterolisthesis at L4-L5. Posterior spinal fusion and laminectomies at L4-L5. Tip of the left L5 transpedicular screw protrudes 3 mm anterior to the vertebral body cortex, in the vicinity of the left common iliac vein (4:186). A drainage catheter in the posterior paraspinal soft tissues with tip at the level of L3.    Grade 1 retrolisthesis at T12-L1 and L1-L2. Chronic loss of height of the superior L2 and L3 endplates. Remaining lumbar vertebral body heights are within normal limits. Multilevel intervertebral disc height loss with vacuum phenomenon.    Cholecystectomy. Colonic diverticulosis.      IMPRESSION: Posterior spinal fusion and laminectomies at L4-L5.

## 2021-01-24 RX ADMIN — URSODIOL 300 MILLIGRAM(S): 250 TABLET, FILM COATED ORAL at 05:04

## 2021-01-24 RX ADMIN — ATORVASTATIN CALCIUM 40 MILLIGRAM(S): 80 TABLET, FILM COATED ORAL at 21:01

## 2021-01-24 RX ADMIN — Medication 100 MICROGRAM(S): at 05:04

## 2021-01-24 RX ADMIN — HYDROMORPHONE HYDROCHLORIDE 2 MILLIGRAM(S): 2 INJECTION INTRAMUSCULAR; INTRAVENOUS; SUBCUTANEOUS at 21:03

## 2021-01-24 RX ADMIN — POLYETHYLENE GLYCOL 3350 17 GRAM(S): 17 POWDER, FOR SOLUTION ORAL at 11:20

## 2021-01-24 RX ADMIN — CYCLOBENZAPRINE HYDROCHLORIDE 5 MILLIGRAM(S): 10 TABLET, FILM COATED ORAL at 17:29

## 2021-01-24 RX ADMIN — SENNA PLUS 2 TABLET(S): 8.6 TABLET ORAL at 21:01

## 2021-01-24 RX ADMIN — CYCLOBENZAPRINE HYDROCHLORIDE 5 MILLIGRAM(S): 10 TABLET, FILM COATED ORAL at 05:04

## 2021-01-24 RX ADMIN — HYDROMORPHONE HYDROCHLORIDE 2 MILLIGRAM(S): 2 INJECTION INTRAMUSCULAR; INTRAVENOUS; SUBCUTANEOUS at 10:17

## 2021-01-24 RX ADMIN — Medication 1 TABLET(S): at 11:20

## 2021-01-24 RX ADMIN — Medication 500 MILLIGRAM(S): at 11:20

## 2021-01-24 RX ADMIN — URSODIOL 300 MILLIGRAM(S): 250 TABLET, FILM COATED ORAL at 21:01

## 2021-01-24 RX ADMIN — HYDROMORPHONE HYDROCHLORIDE 2 MILLIGRAM(S): 2 INJECTION INTRAMUSCULAR; INTRAVENOUS; SUBCUTANEOUS at 17:17

## 2021-01-24 RX ADMIN — HYDROMORPHONE HYDROCHLORIDE 2 MILLIGRAM(S): 2 INJECTION INTRAMUSCULAR; INTRAVENOUS; SUBCUTANEOUS at 05:06

## 2021-01-24 RX ADMIN — HYDROMORPHONE HYDROCHLORIDE 1 MILLIGRAM(S): 2 INJECTION INTRAMUSCULAR; INTRAVENOUS; SUBCUTANEOUS at 22:00

## 2021-01-24 RX ADMIN — URSODIOL 300 MILLIGRAM(S): 250 TABLET, FILM COATED ORAL at 14:24

## 2021-01-24 RX ADMIN — Medication 2000 UNIT(S): at 11:21

## 2021-01-24 NOTE — PROGRESS NOTE ADULT - SUBJECTIVE AND OBJECTIVE BOX
Neurosurgery [facility]  Daily note    This is a 80 year old right hand dominant female with HLD, OA, Asthma, hypothyroidism, DM Type 2(diet control), Lower back pain, seen today pre-op for L4-L5 Posterior lumbar interbody fusion. Pt report longstanding  lumbar pain that has progressively gotten worse within the past one year. Report back pain worse on ambulation, climbing stairs, weight bearing activity and ADLs. Report pain radiates to bilateral lower extremities and buttocks, rate worse pain as 10/10. Pain relieved with Percocet, muscle relaxer and rest. Report prior conservative treatment with pain management, pt underwent steroid injections, physical therapy and  chiropractic treatment without any significant improvement. Patient       INTERVAL HPI OVERNIGHT EVENTS:  This is a 80 year old right hand dominant female s/p seen lying comfortably in bed. Tolerating diet. Passing gas/BM. Voiding. Little in with __ clear urine. Denies headache, weakness, numbness, n/v/d, fevers, chills, chest pain, SOB.     Vital Signs Last 24 Hrs  T(C): 36.6 (24 Jan 2021 08:00), Max: 37 (23 Jan 2021 20:05)  T(F): 97.9 (24 Jan 2021 08:00), Max: 98.6 (23 Jan 2021 20:05)  HR: 94 (24 Jan 2021 08:00) (85 - 101)  BP: 128/71 (24 Jan 2021 08:00) (101/62 - 128/71)  RR: 18 (24 Jan 2021 08:00) (18 - 18)  SpO2: 95% (24 Jan 2021 08:00) (92% - 96%)    PHYSICAL EXAM:  GENERAL: NAD, well-groomed, well-developed  HEAD:  Atraumatic, normocephalic    MENTAL STATUS: AAO x3; Awake/Comatose; Opens eyes spontaneously/to voice/to light touch/to noxious stimuli; Appropriately conversant without aphasia/Nonverbal; following simple commands/mimicking/not following commands  CRANIAL NERVES: Visual acuity normal for age, visual fields full to confrontation, PERRL. EOMI without nystagmus. Facial sensation intact V1-3 distribution b/l. Face symmetric w/ normal eye closure and smile, tongue midline. Hearing grossly intact. Speech clear. Head turning and shoulder shrug intact.   REFLEXES: Doll's eyes positive. Blinks to threat. Gag reflex intact. No pronator drift; DTRs 2+ intact and symmetric; negative Crystal's b/l; negative clonus b/l  MOTOR: strength 5/5 b/l upper and lower extremities  Uppers     Delt     Bicep     Tricep     HG  R                5/5 5/5 5/5 5/5  L                5/5 5/5 5/5 5/5  Lowers      HF     KF      KE     PF     DF     EHL  R             5/5 5/5 5/5 5/5 5/5 5/5  L              5/5 5/5 5/5 5/5 5/5 5/5  SENSATION: grossly intact to light touch all extremities  COORDINATION: Gait intact; rapid alternating movements intact; heel to shin intact; no upper extremity dysmetria  CHEST/LUNG: Clear to auscultation bilaterally; no rales, rhonchi, wheezing, or rubs  HEART: +S1/+S2; Regular rate and rhythm; no murmurs, rubs, or gallops  ABDOMEN: Soft, nontender, nondistended; bowel sounds present all four quadrants  EXTREMITIES:  2+ peripheral pulses, no clubbing, cyanosis, or edema  SKIN: Warm, dry; no rashes or lesions    LABS:                        9.1    10.77 )-----------( 202      ( 23 Jan 2021 08:07 )             27.2     01-23    137  |  103  |  15.0  ----------------------------<  136<H>  4.0   |  23.0  |  0.56    Ca    8.8      23 Jan 2021 08:07            01-23 @ 07:01  -  01-24 @ 07:00  --------------------------------------------------------  IN: 0 mL / OUT: 825 mL / NET: -825 mL        RADIOLOGY & ADDITIONAL TESTS: Neurosurgery [facility]  Daily note    This is a 80 year old right hand dominant female with HLD, OA, Asthma, hypothyroidism, DM Type 2(diet control), Lower back pain, POD#4 status post L4-L5 Posterior lumbar interbody fusion. Patient reported longstanding lumbar pain that has progressively gotten worse within the past one year. Patient is seen and examined and is currently without complaints. Patient has been ambulating with physical therapy and doing well.     INTERVAL HPI OVERNIGHT EVENTS:  This is a 80 year old right hand dominant female POD#4 status post L4-L5 PLIF, sitting in chair this morning just completed physical therapy session, ambulating in the halls and up stairs. Patient is wearing her TLSO brace, which was removed to check wound site.     Vital Signs Last 24 Hrs  T(C): 36.6 (24 Jan 2021 08:00), Max: 37 (23 Jan 2021 20:05)  T(F): 97.9 (24 Jan 2021 08:00), Max: 98.6 (23 Jan 2021 20:05)  HR: 94 (24 Jan 2021 08:00) (85 - 101)  BP: 128/71 (24 Jan 2021 08:00) (101/62 - 128/71)  RR: 18 (24 Jan 2021 08:00) (18 - 18)  SpO2: 95% (24 Jan 2021 08:00) (92% - 96%)    PHYSICAL EXAM:  GENERAL: NAD, well-developed  HEAD:  Atraumatic, normocephalic  Wound: no erythema, no signs of infection, RUSSEL drain intact with serosanguineous fluid   MENTAL STATUS: A A O x 3, Appropriately conversant, following simple commands, recent and remote memory intact   CRANIAL NERVES: PERRL. EOMI without nystagmus. Facial sensation intact V1-3 distribution b/l. Face symmetric w/ normal eye closure and smile, tongue midline. Hearing grossly intact. Speech clear. Head turning and shoulder shrug intact.   REFLEXES: negative clonus b/l  MOTOR: strength 5/5 b/l upper and lower extremities  SENSATION: grossly intact to light touch all extremities  CHEST/LUNG: Clear to auscultation bilaterally  HEART: +S1/+S2  ABDOMEN: Soft, nontender, nondistended  EXTREMITIES:  2+ peripheral pulses, no clubbing, cyanosis, or edema  SKIN: Warm, dry; no rashes or lesions    LABS:                        9.1    10.77 )-----------( 202      ( 23 Jan 2021 08:07 )             27.2     01-23    137  |  103  |  15.0  ----------------------------<  136<H>  4.0   |  23.0  |  0.56    Ca    8.8      23 Jan 2021 08:07    01-23 @ 07:01  -  01-24 @ 07:00  --------------------------------------------------------  IN: 0 mL / OUT: 825 mL / NET: -825 mL    RADIOLOGY & ADDITIONAL TESTS:  EXAM:  CT LUMBAR SPINE                        PROCEDURE DATE:  01/21/2021    INTERPRETATION:  CLINICAL INFORMATION: s/p PLIF. s/p PLIF. ADMDIAG1: M48.062 SPINAL STENOSIS, LUMBAR REGION/.    TECHNIQUE: Noncontrast CT scan of the lumbar spine was performed. Thin section axial images were obtained with sagittal and coronal reformations.    COMPARISON: Imported MRI lumbar spine 12/4/2020.    FINDINGS:    Grade 2 anterolisthesis at L4-L5. Posterior spinal fusion and laminectomies at L4-L5. Tip of the left L5 transpedicular screw protrudes 3 mm anterior to the vertebral body cortex, in the vicinity of the left common iliac vein (4:186). A drainage catheter in the posterior paraspinal soft tissues with tip at the level of L3.    Grade 1 retrolisthesis at T12-L1 and L1-L2. Chronic loss of height of the superior L2 and L3 endplates. Remaining lumbar vertebral body heights are within normal limits. Multilevel intervertebral disc height loss with vacuum phenomenon.    Cholecystectomy. Colonic diverticulosis.      IMPRESSION: Posterior spinal fusion and laminectomies at L4-L5.

## 2021-01-24 NOTE — PROGRESS NOTE ADULT - ASSESSMENT
This is a 80 year old right hand dominant female with HLD, OA, Asthma, hypothyroidism, DM Type 2(diet control), Lower back pain, POD#4 status post L4-L5 posterior lumbar interbody fusion.    1. Continue neuro checks  2. Continue to monitor and record drainage in RUSSEL drain  3. Encourage out of bed to chair  4. TLSO brace when out of bed please   5. Tylenol for pain  6. Plan was discussed with Dr Carrillo

## 2021-01-25 RX ADMIN — Medication 100 MICROGRAM(S): at 05:30

## 2021-01-25 RX ADMIN — CYCLOBENZAPRINE HYDROCHLORIDE 5 MILLIGRAM(S): 10 TABLET, FILM COATED ORAL at 17:19

## 2021-01-25 RX ADMIN — URSODIOL 300 MILLIGRAM(S): 250 TABLET, FILM COATED ORAL at 05:30

## 2021-01-25 RX ADMIN — Medication 2000 UNIT(S): at 12:12

## 2021-01-25 RX ADMIN — HYDROMORPHONE HYDROCHLORIDE 2 MILLIGRAM(S): 2 INJECTION INTRAMUSCULAR; INTRAVENOUS; SUBCUTANEOUS at 05:34

## 2021-01-25 RX ADMIN — URSODIOL 300 MILLIGRAM(S): 250 TABLET, FILM COATED ORAL at 12:13

## 2021-01-25 RX ADMIN — CYCLOBENZAPRINE HYDROCHLORIDE 5 MILLIGRAM(S): 10 TABLET, FILM COATED ORAL at 05:30

## 2021-01-25 RX ADMIN — HYDROMORPHONE HYDROCHLORIDE 1 MILLIGRAM(S): 2 INJECTION INTRAMUSCULAR; INTRAVENOUS; SUBCUTANEOUS at 22:44

## 2021-01-25 RX ADMIN — POLYETHYLENE GLYCOL 3350 17 GRAM(S): 17 POWDER, FOR SOLUTION ORAL at 12:12

## 2021-01-25 RX ADMIN — Medication 500 MILLIGRAM(S): at 12:12

## 2021-01-25 RX ADMIN — HYDROMORPHONE HYDROCHLORIDE 2 MILLIGRAM(S): 2 INJECTION INTRAMUSCULAR; INTRAVENOUS; SUBCUTANEOUS at 21:24

## 2021-01-25 RX ADMIN — Medication 1 TABLET(S): at 12:12

## 2021-01-25 RX ADMIN — URSODIOL 300 MILLIGRAM(S): 250 TABLET, FILM COATED ORAL at 21:26

## 2021-01-25 RX ADMIN — HYDROMORPHONE HYDROCHLORIDE 2 MILLIGRAM(S): 2 INJECTION INTRAMUSCULAR; INTRAVENOUS; SUBCUTANEOUS at 17:19

## 2021-01-25 RX ADMIN — HYDROMORPHONE HYDROCHLORIDE 2 MILLIGRAM(S): 2 INJECTION INTRAMUSCULAR; INTRAVENOUS; SUBCUTANEOUS at 12:12

## 2021-01-25 RX ADMIN — Medication 10 MILLIGRAM(S): at 12:12

## 2021-01-25 RX ADMIN — Medication 1 APPLICATORFUL: at 12:12

## 2021-01-25 RX ADMIN — ATORVASTATIN CALCIUM 40 MILLIGRAM(S): 80 TABLET, FILM COATED ORAL at 21:25

## 2021-01-25 NOTE — DIETITIAN INITIAL EVALUATION ADULT. - PERTINENT MEDS FT
MEDICATIONS  (STANDING):  ascorbic acid 500 milliGRAM(s) Oral daily  atorvastatin 40 milliGRAM(s) Oral at bedtime  budesonide 160 MICROgram(s)/formoterol 4.5 MICROgram(s) Inhaler 2 Puff(s) Inhalation two times a day  cholecalciferol 2000 Unit(s) Oral daily  clotrimazole 1% Vaginal Cream 1 Applicatorful Vaginal at bedtime  cyclobenzaprine 5 milliGRAM(s) Oral every 12 hours  levothyroxine 100 MICROGram(s) Oral daily  multivitamin/minerals 1 Tablet(s) Oral daily  polyethylene glycol 3350 17 Gram(s) Oral daily  senna 2 Tablet(s) Oral at bedtime  ursodiol Capsule 300 milliGRAM(s) Oral three times a day    MEDICATIONS  (PRN):  acetaminophen   Tablet .. 650 milliGRAM(s) Oral every 6 hours PRN Mild Pain (1 - 3)  ALBUTerol  90 MICROgram(s) HFA Inhaler - Peds 2 Puff(s) Inhalation four times a day PRN Shortness of Breath and/or Wheezing  artificial  tears Solution 1 Drop(s) Both EYES three times a day PRN Dry Eyes  bisacodyl Suppository 10 milliGRAM(s) Rectal daily PRN Constipation  HYDROmorphone   Tablet 2 milliGRAM(s) Oral every 4 hours PRN Moderate Pain (4 - 6)  HYDROmorphone  Injectable 1 milliGRAM(s) IV Push every 4 hours PRN Breakthrough Pain  mineral oil enema 133 milliLiter(s) Rectal daily PRN if no BM after suppository

## 2021-01-25 NOTE — PROGRESS NOTE ADULT - ATTENDING COMMENTS
Attending statement:  I have personally seen this patient, and formed a face to face diagnostic evaluation on this patient on this date.  I have reviewed the PA, NP and or Medical/PA student and/or Resident documentation and agree with the history, physical exam and plan of care except if noted otherwise.
PRINCE Attg:    agree with above
NGSY Attg:    see above    patient seen and examined    agree with exam as above; pain controlled; LSO brace at bedside    patient states she is doing well  ambulated with PT earlier today  improved back and leg pain with weight bearing and ambulating  LE 5/5    CT reviewed -- good placement of instrumentation    agree with plan as above  continue pain control  continue RUSSEL  PT/OT
NSGY Attg:    see above    patient seen and examined    agree with exam and plan as above  continue RUSSEL for now
NSGY Attg:    see above    patient seen and examined    agree with exam as above  LE 5/5    agree with plan as above  continue RUSSEL drain
NSGY Attg:    see above    patient seen and examined by PA staff    agree with exam and plan as above  d/c RUSSEL later today
NGSY Attg:    see above    patient seen and examined    agree with exam as above  LE 5/5    agree with plan as above  OOB with brace  pain control  continue RUSSEL for now

## 2021-01-25 NOTE — PROGRESS NOTE ADULT - SUBJECTIVE AND OBJECTIVE BOX
HPI:  HPI:  79 y/o female with HLD, OA, Asthma, hypothyroidism, DM Type 2(diet control), Lower back pain, seen today pre-op for L4-L5 Posterior lumbar interbody fusion. Pt report longstanding  lumbar pain that has progressively gotten worse within the past one year. Report back pain worse on ambulation, climbing stairs, weight bearing activity and ADLs. Report pain radiates to bilateral lower extremities and buttocks, rate worse pain as 10/10. Pain relieved with Percocet, muscle relaxer and rest. Report prior conservative treatment with pain management, pt underwent steroid injections, physical therapy and  chiropractic treatment without any significant improvement.   Pt seen today for a scheduled surgery on 1/20/2021 with Dr. Carrillo.        (13 Jan 2021 08:11)      INTERVAL HPI/OVERNIGHT EVENTS:  80y Female s/p __ seen lying comfortably in bed. Tolerating diet. Passing gas/BM. Voiding. Little in with __ clear urine. Denies headache, weakness, numbness, n/v/d, fevers, chills, chest pain, SOB.     Vital Signs Last 24 Hrs  T(C): 36.6 (25 Jan 2021 08:21), Max: 36.8 (24 Jan 2021 23:54)  T(F): 97.8 (25 Jan 2021 08:21), Max: 98.3 (25 Jan 2021 04:40)  HR: 97 (25 Jan 2021 08:21) (92 - 97)  BP: 125/72 (25 Jan 2021 08:21) (104/64 - 125/72)  BP(mean): --  RR: 18 (25 Jan 2021 08:21) (18 - 18)  SpO2: 95% (25 Jan 2021 08:21) (90% - 95%)    PHYSICAL EXAM:  GENERAL: NAD, well-groomed, well-developed  HEAD:  Atraumatic, normocephalic  DRAINS:   WOUND: Dressing clean dry intact  JACKELINE COMA SCORE: E- V- M- =       E: 4= opens eyes spontaneously 3= to voice 2= to noxious 1= no opening       V: 5= oriented 4= confused 3= inappropriate words 2= incomprehensible sounds 1= nonverbal 1T= intubated       M: 6= follows commands 5= localizes 4= withdraws 3= flexor posturing 2= extensor posturing 1= no movement  MENTAL STATUS: AAO x3; Awake/Comatose; Opens eyes spontaneously/to voice/to light touch/to noxious stimuli; Appropriately conversant without aphasia/Nonverbal; following simple commands/mimicking/not following commands  CRANIAL NERVES: Visual acuity normal for age, visual fields full to confrontation, PERRL. EOMI without nystagmus. Facial sensation intact V1-3 distribution b/l. Face symmetric w/ normal eye closure and smile, tongue midline. Hearing grossly intact. Speech clear. Head turning and shoulder shrug intact.   REFLEXES: Doll's eyes positive. Blinks to threat. Gag reflex intact. No pronator drift; DTRs 2+ intact and symmetric; negative Crystal's b/l; negative clonus b/l  MOTOR: strength 5/5 b/l upper and lower extremities  Uppers     Delt     Bicep     Tricep     HG  R                5/5       5/5         5/5         5/5  L                5/5       5/5         5/5         5/5  Lowers      HF     KF      KE     PF     DF     EHL  R             5/5     5/5     5/5    5/5   5/5    5/5  L              5/5    5/5      5/5    5/5   5/5    5/5  SENSATION: grossly intact to light touch all extremities  COORDINATION: Gait intact; rapid alternating movements intact; heel to shin intact; no upper extremity dysmetria  CHEST/LUNG: Clear to auscultation bilaterally; no rales, rhonchi, wheezing, or rubs  HEART: +S1/+S2; Regular rate and rhythm; no murmurs, rubs, or gallops  ABDOMEN: Soft, nontender, nondistended; bowel sounds present all four quadrants  EXTREMITIES:  2+ peripheral pulses, no clubbing, cyanosis, or edema  SKIN: Warm, dry; no rashes or lesions    LABS:                01-24 @ 07:01  -  01-25 @ 07:00  --------------------------------------------------------  IN: 120 mL / OUT: 520 mL / NET: -400 mL        RADIOLOGY & ADDITIONAL TESTS: HPI:    79 y/o female with HLD, OA, Asthma, hypothyroidism, DM Type 2(diet control), Lower back pain, seen today pre-op for L4-L5 Posterior lumbar interbody fusion. Pt report longstanding  lumbar pain that has progressively gotten worse within the past one year. Report back pain worse on ambulation, climbing stairs, weight bearing activity and ADLs. Report pain radiates to bilateral lower extremities and buttocks, rate worse pain as 10/10. Pain relieved with Percocet, muscle relaxer and rest. Report prior conservative treatment with pain management, pt underwent steroid injections, physical therapy and  chiropractic treatment without any significant improvement.   Pt seen today for a scheduled surgery on 1/20/2021 with Dr. Carrillo.       INTERVAL HPI/OVERNIGHT EVENTS:    79 y/o Female POD 5 L4-L5 PLIF examined at bedside today. VSS. No acute neurological events overnight. Pt started on clotrimazole cream for yeast infection.  RUSSEL drain in place. Drain output 70/120. PT ambulating with LSO brace. PT following, anticipate d/c home with outpatient services and rolling walker.      Vital Signs Last 24 Hrs  T(C): 36.6 (25 Jan 2021 08:21), Max: 36.8 (24 Jan 2021 23:54)  T(F): 97.8 (25 Jan 2021 08:21), Max: 98.3 (25 Jan 2021 04:40)  HR: 97 (25 Jan 2021 08:21) (92 - 97)  BP: 125/72 (25 Jan 2021 08:21) (104/64 - 125/72)  RR: 18 (25 Jan 2021 08:21) (18 - 18)  SpO2: 95% (25 Jan 2021 08:21) (90% - 95%)    PHYSICAL EXAM:    GENERAL: NAD, well-groomed, well-developed  HEAD:  Atraumatic, normocephalic  DRAINS:   WOUND: Dressing clean dry intact  JACKELINE COMA SCORE: E- V- M- =       E: 4= opens eyes spontaneously 3= to voice 2= to noxious 1= no opening       V: 5= oriented 4= confused 3= inappropriate words 2= incomprehensible sounds 1= nonverbal 1T= intubated       M: 6= follows commands 5= localizes 4= withdraws 3= flexor posturing 2= extensor posturing 1= no movement  MENTAL STATUS: AAO x3; Awake/Comatose; Opens eyes spontaneously/to voice/to light touch/to noxious stimuli; Appropriately conversant without aphasia/Nonverbal; following simple commands/mimicking/not following commands  CRANIAL NERVES: Visual acuity normal for age, visual fields full to confrontation, PERRL. EOMI without nystagmus. Facial sensation intact V1-3 distribution b/l. Face symmetric w/ normal eye closure and smile, tongue midline. Hearing grossly intact. Speech clear. Head turning and shoulder shrug intact.   REFLEXES: Doll's eyes positive. Blinks to threat. Gag reflex intact. No pronator drift; DTRs 2+ intact and symmetric; negative Crystal's b/l; negative clonus b/l  MOTOR: strength 5/5 b/l upper and lower extremities  Uppers     Delt     Bicep     Tricep     HG  R                5/5       5/5         5/5         5/5  L                5/5       5/5         5/5         5/5  Lowers      HF     KF      KE     PF     DF     EHL  R             5/5     5/5     5/5    5/5   5/5    5/5  L              5/5    5/5      5/5    5/5   5/5    5/5  SENSATION: grossly intact to light touch all extremities  COORDINATION: Gait intact; rapid alternating movements intact; heel to shin intact; no upper extremity dysmetria  CHEST/LUNG: Clear to auscultation bilaterally; no rales, rhonchi, wheezing, or rubs  HEART: +S1/+S2; Regular rate and rhythm; no murmurs, rubs, or gallops  ABDOMEN: Soft, nontender, nondistended; bowel sounds present all four quadrants  EXTREMITIES:  2+ peripheral pulses, no clubbing, cyanosis, or edema  SKIN: Warm, dry; no rashes or lesions    LABS:                01-24 @ 07:01  -  01-25 @ 07:00  --------------------------------------------------------  IN: 120 mL / OUT: 520 mL / NET: -400 mL         HPI:    81 y/o female with HLD, OA, Asthma, hypothyroidism, DM Type 2 (diet control), Lower back pain, seen today pre-op for L4-L5 Posterior lumbar interbody fusion. Pt report longstanding  lumbar pain that has progressively gotten worse within the past one year. Report back pain worse on ambulation, climbing stairs, weight bearing activity and ADLs. Report pain radiates to bilateral lower extremities and buttocks, rate worse pain as 10/10. Pain relieved with Percocet, muscle relaxer and rest. Report prior conservative treatment with pain management, pt underwent steroid injections, physical therapy and  chiropractic treatment without any significant improvement. Pt seen today for a scheduled surgery on 1/20/2021 with Dr. Carrillo.       INTERVAL HPI/OVERNIGHT EVENTS:    81 y/o Female POD 5 L4-L5 PLIF examined at bedside today. VSS. No acute neurological events overnight. Pt started on clotrimazole cream for yeast infection.  RUSSEL drain in place. Drain output 70/120. PT ambulating with LSO brace. PT following, anticipate d/c home with outpatient services and rolling walker.      Vital Signs Last 24 Hrs  T(C): 36.6 (25 Jan 2021 08:21), Max: 36.8 (24 Jan 2021 23:54)  T(F): 97.8 (25 Jan 2021 08:21), Max: 98.3 (25 Jan 2021 04:40)  HR: 97 (25 Jan 2021 08:21) (92 - 97)  BP: 125/72 (25 Jan 2021 08:21) (104/64 - 125/72)  RR: 18 (25 Jan 2021 08:21) (18 - 18)  SpO2: 95% (25 Jan 2021 08:21) (90% - 95%)    PHYSICAL EXAM:    GENERAL: NAD, well-groomed, well-developed  HEAD:  Atraumatic, normocephalic  WOUND: Dressing changed, Stearns C/D/I    DRAIN: Serosanguinous drainage   MENTAL STATUS: AAO x3, Awake  MOTOR: strength 5/5 b/l upper and lower extremities  SENSATION: grossly intact to light touch all extremities  SKIN: Warm, dry       01-24 @ 07:01  -  01-25 @ 07:00  --------------------------------------------------------  IN: 120 mL / OUT: 520 mL / NET: -400 mL

## 2021-01-25 NOTE — PROGRESS NOTE ADULT - ASSESSMENT
A/P: 81 y/o female POD L4-L5 PLIF, RUSSEL drain in place. Doing well.     Discussed with Dr. Shoemaker/ Gerardo     continue q4h neuro checks   Keep RUSSEL drain for now and monitor output through the day  Labs ordered for AM  continue PT   continue to wear LSO brace while OOB   continue SCD's for DVT prophylaxis      A/P: 79 y/o female POD L4-L5 PLIF, RUSSEL drain in place. Doing well.     Discussed with Dr. Carrillo     continue q4h neuro checks   will remove RUSSEL drain today   Labs ordered for AM  continue PT   continue to wear LSO brace while OOB   continue SCD's for DVT prophylaxis

## 2021-01-25 NOTE — PROGRESS NOTE ADULT - PROVIDER SPECIALTY LIST ADULT
Neurosurgery
Orthopedics
Neurosurgery
Orthopedics

## 2021-01-25 NOTE — DIETITIAN INITIAL EVALUATION ADULT. - OTHER INFO
A/P: 81 y/o female POD L4-L5 PLIF, RUSSEL drain in place. Doing well.  Pt reports that she manages her DM with her diet. She said her A1c is never over 6.  She is currently receiving ensure which she does not want and is not appropriate for her diet restriction     PO intake is 75% and pt reports no changes in weight.  Diet, Consistent Carbohydrate w/Evening Snack:   Supplement Feeding Modality:  Oral  Ensure Enlive Cans or Servings Per Day:  3       Frequency:  Three Times a day (01-22-21 @ 18:06)

## 2021-01-26 ENCOUNTER — TRANSCRIPTION ENCOUNTER (OUTPATIENT)
Age: 81
End: 2021-01-26

## 2021-01-26 VITALS
DIASTOLIC BLOOD PRESSURE: 79 MMHG | SYSTOLIC BLOOD PRESSURE: 121 MMHG | HEART RATE: 90 BPM | RESPIRATION RATE: 18 BRPM | OXYGEN SATURATION: 93 %

## 2021-01-26 PROBLEM — M19.90 UNSPECIFIED OSTEOARTHRITIS, UNSPECIFIED SITE: Chronic | Status: ACTIVE | Noted: 2021-01-13

## 2021-01-26 PROBLEM — M48.062 SPINAL STENOSIS, LUMBAR REGION WITH NEUROGENIC CLAUDICATION: Chronic | Status: ACTIVE | Noted: 2021-01-13

## 2021-01-26 LAB
ANION GAP SERPL CALC-SCNC: 12 MMOL/L — SIGNIFICANT CHANGE UP (ref 5–17)
BUN SERPL-MCNC: 15 MG/DL — SIGNIFICANT CHANGE UP (ref 8–20)
CALCIUM SERPL-MCNC: 8.8 MG/DL — SIGNIFICANT CHANGE UP (ref 8.6–10.2)
CHLORIDE SERPL-SCNC: 102 MMOL/L — SIGNIFICANT CHANGE UP (ref 98–107)
CO2 SERPL-SCNC: 26 MMOL/L — SIGNIFICANT CHANGE UP (ref 22–29)
CREAT SERPL-MCNC: 0.5 MG/DL — SIGNIFICANT CHANGE UP (ref 0.5–1.3)
GLUCOSE SERPL-MCNC: 95 MG/DL — SIGNIFICANT CHANGE UP (ref 70–99)
HCT VFR BLD CALC: 26.3 % — LOW (ref 34.5–45)
HGB BLD-MCNC: 8.5 G/DL — LOW (ref 11.5–15.5)
MAGNESIUM SERPL-MCNC: 1.9 MG/DL — SIGNIFICANT CHANGE UP (ref 1.8–2.6)
MCHC RBC-ENTMCNC: 32.2 PG — SIGNIFICANT CHANGE UP (ref 27–34)
MCHC RBC-ENTMCNC: 32.3 GM/DL — SIGNIFICANT CHANGE UP (ref 32–36)
MCV RBC AUTO: 99.6 FL — SIGNIFICANT CHANGE UP (ref 80–100)
PHOSPHATE SERPL-MCNC: 3.5 MG/DL — SIGNIFICANT CHANGE UP (ref 2.4–4.7)
PLATELET # BLD AUTO: 284 K/UL — SIGNIFICANT CHANGE UP (ref 150–400)
POTASSIUM SERPL-MCNC: 4.3 MMOL/L — SIGNIFICANT CHANGE UP (ref 3.5–5.3)
POTASSIUM SERPL-SCNC: 4.3 MMOL/L — SIGNIFICANT CHANGE UP (ref 3.5–5.3)
RBC # BLD: 2.64 M/UL — LOW (ref 3.8–5.2)
RBC # FLD: 13.2 % — SIGNIFICANT CHANGE UP (ref 10.3–14.5)
SODIUM SERPL-SCNC: 139 MMOL/L — SIGNIFICANT CHANGE UP (ref 135–145)
WBC # BLD: 8.34 K/UL — SIGNIFICANT CHANGE UP (ref 3.8–10.5)
WBC # FLD AUTO: 8.34 K/UL — SIGNIFICANT CHANGE UP (ref 3.8–10.5)

## 2021-01-26 PROCEDURE — 85027 COMPLETE CBC AUTOMATED: CPT

## 2021-01-26 PROCEDURE — 86900 BLOOD TYPING SEROLOGIC ABO: CPT

## 2021-01-26 PROCEDURE — 97163 PT EVAL HIGH COMPLEX 45 MIN: CPT

## 2021-01-26 PROCEDURE — 94640 AIRWAY INHALATION TREATMENT: CPT

## 2021-01-26 PROCEDURE — 97110 THERAPEUTIC EXERCISES: CPT

## 2021-01-26 PROCEDURE — C1889: CPT

## 2021-01-26 PROCEDURE — 85025 COMPLETE CBC W/AUTO DIFF WBC: CPT

## 2021-01-26 PROCEDURE — 72131 CT LUMBAR SPINE W/O DYE: CPT

## 2021-01-26 PROCEDURE — 80048 BASIC METABOLIC PNL TOTAL CA: CPT

## 2021-01-26 PROCEDURE — 86901 BLOOD TYPING SEROLOGIC RH(D): CPT

## 2021-01-26 PROCEDURE — 36415 COLL VENOUS BLD VENIPUNCTURE: CPT

## 2021-01-26 PROCEDURE — 82962 GLUCOSE BLOOD TEST: CPT

## 2021-01-26 PROCEDURE — 97530 THERAPEUTIC ACTIVITIES: CPT

## 2021-01-26 PROCEDURE — 97167 OT EVAL HIGH COMPLEX 60 MIN: CPT

## 2021-01-26 PROCEDURE — C1713: CPT

## 2021-01-26 PROCEDURE — 86850 RBC ANTIBODY SCREEN: CPT

## 2021-01-26 PROCEDURE — 83735 ASSAY OF MAGNESIUM: CPT

## 2021-01-26 PROCEDURE — 76000 FLUOROSCOPY <1 HR PHYS/QHP: CPT

## 2021-01-26 PROCEDURE — 84134 ASSAY OF PREALBUMIN: CPT

## 2021-01-26 PROCEDURE — 97116 GAIT TRAINING THERAPY: CPT

## 2021-01-26 PROCEDURE — 84100 ASSAY OF PHOSPHORUS: CPT

## 2021-01-26 RX ORDER — OXYCODONE HYDROCHLORIDE 5 MG/1
1 TABLET ORAL
Qty: 12 | Refills: 0
Start: 2021-01-26 | End: 2021-01-28

## 2021-01-26 RX ADMIN — URSODIOL 300 MILLIGRAM(S): 250 TABLET, FILM COATED ORAL at 05:49

## 2021-01-26 RX ADMIN — Medication 500 MILLIGRAM(S): at 11:53

## 2021-01-26 RX ADMIN — Medication 2000 UNIT(S): at 11:53

## 2021-01-26 RX ADMIN — Medication 100 MICROGRAM(S): at 05:49

## 2021-01-26 RX ADMIN — HYDROMORPHONE HYDROCHLORIDE 2 MILLIGRAM(S): 2 INJECTION INTRAMUSCULAR; INTRAVENOUS; SUBCUTANEOUS at 05:47

## 2021-01-26 RX ADMIN — HYDROMORPHONE HYDROCHLORIDE 1 MILLIGRAM(S): 2 INJECTION INTRAMUSCULAR; INTRAVENOUS; SUBCUTANEOUS at 07:45

## 2021-01-26 RX ADMIN — Medication 1 TABLET(S): at 11:53

## 2021-01-26 RX ADMIN — CYCLOBENZAPRINE HYDROCHLORIDE 5 MILLIGRAM(S): 10 TABLET, FILM COATED ORAL at 05:49

## 2021-01-26 NOTE — DISCHARGE NOTE PROVIDER - NSDCCAREPROVSEEN_GEN_ALL_CORE_FT
Rickey Carrillo Rickey Carrillo Attg:    see above    patient seen and examined    agree with exam as above    f/u next week for wound check

## 2021-01-26 NOTE — DISCHARGE NOTE PROVIDER - CARE PROVIDER_API CALL
Rickey Carrillo)  Neurosurgery  [address] [place] [address]  Phone: [phone]  Fax: [phone]  Follow Up Time: 1 week

## 2021-01-26 NOTE — DISCHARGE NOTE PROVIDER - NSDCCPCAREPLAN_GEN_ALL_CORE_FT
PRINCIPAL DISCHARGE DIAGNOSIS  Diagnosis: Lumbar spinal stenosis  Assessment and Plan of Treatment:

## 2021-01-26 NOTE — DISCHARGE NOTE PROVIDER - HOSPITAL COURSE
79 y/o female with HLD, OA, Asthma, hypothyroidism, DM Type 2 (diet control), Lower back pain, admitted to [facility] on 1/20/21 for L4-L5 Posterior lumbar interbody fusion for longstanding lumbar pain that has progressively gotten worse within the past one year. Patient underwent a L4-L5 PLIF with Dr. Carrillo on 1/20/21. There were no complications during the procedure, patient was extubated and taken to the floor with a RUSSEL drain. 	Pt evaluated by PT/OT with recs to discharge home with a rolling walker and outpatient services. RUSSEL drain removed on 1/25. Neuro exam stable, Motor exam 5/5 B/L UE/LE and patient reports improvement of pain. Pt stable for discharge home today. Has LSO brace to be worn at all times OOB. Pt is to F/U with Dr. Carrillo and PCP outpatient.    81 y/o female with HLD, OA, Asthma, hypothyroidism, DM Type 2 (diet control), Lower back pain, admitted to [facility] on 1/20/21 for L4-L5 Posterior lumbar interbody fusion for longstanding lumbar pain that has progressively gotten worse within the past one year. Patient underwent a L4-L5 PLIF with Dr. Carrillo on 1/20/21. There were no complications during the procedure, patient was extubated and taken to the floor with a RUSSEL drain. 	Pt evaluated by PT/OT with recs to discharge home with a rolling walker and outpatient services. RUSSEL drain removed on 1/25. Neuro exam stable, wound appears C/D/I, staples intact. Motor exam 5/5 B/L UE/LE.  Pt stable for discharge home today.  Pt was tx for a yeast infection with cream in hospital, reports that she has treatment at home and does not need a new prescription and will F/U with her PCP. Has LSO brace to be worn at all times OOB. Pt is to F/U with Dr. Carrillo and PCP outpatient. Patient agrees and understands plan.

## 2021-01-26 NOTE — DISCHARGE NOTE PROVIDER - NSDCFUSCHEDAPPT_GEN_ALL_CORE_FT
IHSAN KIM ; 04/22/2021 ; NPP PulmMed [address] [place] [address] IHSAN KIM ; 02/04/2021 ; NPP Neurosurg [address] [facility]  IHSAN KIM ; 04/22/2021 ; NPP Pul[address]

## 2021-01-26 NOTE — DISCHARGE NOTE PROVIDER - NSDCFUADDINST_GEN_ALL_CORE_FT
Take medications as directed. F/U with Dr. Carrillo outpatient in 1 week for wound check. F/U with your primary care provider at the next available appointment. Contact the office immediately, return to the ER, or call 911 if you notice new or worsening symptoms such as any discharge/ foul smelling drainage, fever, chills, increased pain, nausea, vomiting, SOB, CP. Take medications as directed. Do not drink, drive, or operate any heavy machinery while taking.  F/U with Dr. Carrillo outpatient in 1 week for wound check. F/U with your primary care provider at the next available appointment. Contact the office immediately, return to the ER, or call 911 if you notice new or worsening symptoms such as any discharge/ foul smelling drainage, fever, chills, increased pain, nausea, vomiting, SOB, CP.

## 2021-01-26 NOTE — DISCHARGE NOTE PROVIDER - NSDCMRMEDTOKEN_GEN_ALL_CORE_FT
Actigall 300 mg oral capsule: 1 cap(s) orally 3 times a day  cyclobenzaprine 5 mg oral tablet:   Percocet 5/325 oral tablet:   Physical Therapy : Please Evaluate and treat post-op L4/5 PLIF   Proventil HFA 90 mcg/inh inhalation aerosol: 2 puff(s) inhaled 4 times a day, As Needed - for shortness of breath and/or wheezing  Restasis 0.05% ophthalmic emulsion: 1 drop(s) to each affected eye every 12 hours  rosuvastatin 10 mg oral tablet: 1 tab(s) orally once a day  Symbicort 160 mcg-4.5 mcg/inh inhalation aerosol: 2 puff(s) inhaled 2 times a day  Synthroid 100 mcg (0.1 mg) oral tablet: 1 tab(s) orally once a day  Vitamin D3 2000 intl units oral capsule: 1 cap(s) orally once a day   Actigall 300 mg oral capsule: 1 cap(s) orally 3 times a day  cyclobenzaprine 5 mg oral tablet:   oxyCODONE 5 mg oral capsule: 1 cap(s) orally every 6 hours as needed for pain MDD:4 capsules  Physical Therapy : Please Evaluate and treat post-op L4/5 PLIF   Proventil HFA 90 mcg/inh inhalation aerosol: 2 puff(s) inhaled 4 times a day, As Needed - for shortness of breath and/or wheezing  Restasis 0.05% ophthalmic emulsion: 1 drop(s) to each affected eye every 12 hours  Rolling Walker : Use with ambulation   rosuvastatin 10 mg oral tablet: 1 tab(s) orally once a day  Symbicort 160 mcg-4.5 mcg/inh inhalation aerosol: 2 puff(s) inhaled 2 times a day  Synthroid 100 mcg (0.1 mg) oral tablet: 1 tab(s) orally once a day  Vitamin D3 2000 intl units oral capsule: 1 cap(s) orally once a day

## 2021-01-26 NOTE — DISCHARGE NOTE NURSING/CASE MANAGEMENT/SOCIAL WORK - PATIENT PORTAL LINK FT
You can access the FollowMyHealth Patient Portal offered by [facility] by registering at the following website: [email or web address]. By joining [facility]’s FollowMyHealth portal, you will also be able to view your health information using other applications (apps) compatible with our system.

## 2021-02-04 ENCOUNTER — APPOINTMENT (OUTPATIENT)
Dept: NEUROSURGERY | Facility: CLINIC | Age: 81
End: 2021-02-04
Payer: MEDICARE

## 2021-02-04 VITALS
TEMPERATURE: 97.6 F | WEIGHT: 146 LBS | BODY MASS INDEX: 30.64 KG/M2 | SYSTOLIC BLOOD PRESSURE: 151 MMHG | OXYGEN SATURATION: 95 % | HEART RATE: 80 BPM | DIASTOLIC BLOOD PRESSURE: 88 MMHG | HEIGHT: 58 IN

## 2021-02-04 PROCEDURE — 99024 POSTOP FOLLOW-UP VISIT: CPT

## 2021-02-04 NOTE — ASSESSMENT
[FreeTextEntry1] : Ms. Tellez is doing well from the postoperative perspective.  She is neurologically intact.\par \par \par Plan:\par Continue LSO bracing when OOB\par Start outpatient course of physical therapy for strengthening.\par Goal to titrate down from Dilaudid 4 mg BID to oxycodone 5 mg every 6 hours as needed.\par Methocarbamol 500 mg TID.\par Keep incision clean and dry and open to air.\par Follow up in 1 month to assess her recovery.\par Patient knows to call the office if there are any new or worsening symptoms.\par \par \par

## 2021-02-04 NOTE — PHYSICAL EXAM
[General Appearance - Alert] : alert [General Appearance - In No Acute Distress] : in no acute distress [General Appearance - Well Nourished] : well nourished [General Appearance - Well Developed] : well developed [Longitudinal] : longitudinal [Clean] : clean [Intact] : intact [No Drainage] : without drainage [Normal Skin] : normal [Erythema] : erythematous [Generalized] : throughout the incision [Oriented To Time, Place, And Person] : oriented to person, place, and time [Impaired Insight] : insight and judgment were intact [Affect] : the affect was normal [Mood] : the mood was normal [Person] : oriented to person [Place] : oriented to place [Time] : oriented to time [Span Intact] : the attention span was normal [Comprehension] : comprehension intact [Cranial Nerves Optic (II)] : visual acuity intact bilaterally,  pupils equal round and reactive to light [Cranial Nerves Oculomotor (III)] : extraocular motion intact [Cranial Nerves Trigeminal (V)] : facial sensation intact symmetrically [Cranial Nerves Facial (VII)] : face symmetrical [Cranial Nerves Glossopharyngeal (IX)] : tongue and palate midline [Cranial Nerves Accessory (XI - Cranial And Spinal)] : head turning and shoulder shrug symmetric [Motor Tone] : muscle tone was normal in all four extremities [Motor Strength] : muscle strength was normal in all four extremities [Sensation Tactile Decrease] : light touch was intact [Sensation Pain / Temperature Decrease] : pain and temperature was intact [Abnormal Walk] : normal gait [Balance] : balance was intact [No Visual Abnormalities] : no visible abnormailities [Normal] : normal [FreeTextEntry1] : lumbar region  [Over the Past 2 Weeks, Have You Felt Down, Depressed, or Hopeless?] : 1.) Over the past 2 weeks, have you felt down, depressed, or hopeless? No [Over the Past 2 Weeks, Have You Felt Little Interest or Pleasure Doing Things?] : 2.) Over the past 2 weeks, have you felt little interest or pleasure doing things? No

## 2021-02-04 NOTE — REVIEW OF SYSTEMS
[Feeling Tired] : feeling tired [As Noted in HPI] : as noted in HPI [Negative] : Heme/Lymph [Numbness] : no numbness [Tingling] : no tingling

## 2021-02-04 NOTE — REASON FOR VISIT
[de-identified] : L4-L5 posterior instrumented fusion with pedicle screw fixation [de-identified] : 1/20/2021 [de-identified] : Ms. Tellez presents for post op visit and removal of staples 79 y/o female with HLD, OA, Asthma, hypothyroidism, DM Type 2 (diet control), Lower back pain, admitted to [facility] on 1/20/21 for L4-L5 Posterior lumbar interbody fusion for longstanding lumbar pain that has progressively gotten worse within the past one year. Patient underwent a L4-L5 PLIF with Dr. Carrillo on 1/20/21. She presents and has been compliant with her LSO bracing when OOB.  Denies any urinary dysfunction.  She is ambulating independently.  She endorses mild to moderate lumbar pain with mild pain of the right quadriceps and calf.  She is managing her pain with Dilaudid 4 mg 2-3 pills per day and methocarbamol 500 mg TID.  Her incision is dry and intact.  Wound edges well approximated.  Mild erythema around the surrounding borders. No drainage.

## 2021-02-10 ENCOUNTER — TRANSCRIPTION ENCOUNTER (OUTPATIENT)
Age: 81
End: 2021-02-10

## 2021-02-17 RX ORDER — ALBUTEROL SULFATE 90 UG/1
108 (90 BASE) INHALANT RESPIRATORY (INHALATION)
Qty: 3 | Refills: 1 | Status: ACTIVE | COMMUNITY
Start: 2020-09-09 | End: 1900-01-01

## 2021-03-04 ENCOUNTER — APPOINTMENT (OUTPATIENT)
Dept: NEUROSURGERY | Facility: CLINIC | Age: 81
End: 2021-03-04
Payer: MEDICARE

## 2021-03-04 VITALS
BODY MASS INDEX: 29.81 KG/M2 | HEIGHT: 58 IN | SYSTOLIC BLOOD PRESSURE: 135 MMHG | WEIGHT: 142 LBS | DIASTOLIC BLOOD PRESSURE: 84 MMHG | HEART RATE: 84 BPM | TEMPERATURE: 97.9 F | OXYGEN SATURATION: 98 %

## 2021-03-04 PROCEDURE — 99024 POSTOP FOLLOW-UP VISIT: CPT

## 2021-03-04 NOTE — REASON FOR VISIT
[Spouse] : spouse [de-identified] : status post L4-L5 posterior instrumented fusion with pedicle screw fixation  [de-identified] : 1/20/2021 [de-identified] : Surgery Date: 1/20/2021 Ms. Tellez presents for post op visit.   79 y/o female with HLD, OA, Asthma, hypothyroidism, DM Type 2 (diet control), Lower back pain, admitted to [facility] on 1/20/21 for L4-L5 Posterior lumbar interbody fusion for longstanding lumbar pain that has progressively gotten worse within the past one year. Patient underwent a L4-L5 PLIF with Dr. Carrillo on 1/20/21. She presents and has been compliant with her LSO bracing when OOB. Denies any urinary dysfunction. She is ambulating independently. She endorses mild to moderate lumbar pain with mild pain of the right quadriceps and calf. She is managing her pain with Dilaudid 4 mg 2-3 pills per day and methocarbamol 500 mg TID. Her incision is dry and intact. Wound edges well approximated. Mild erythema around the surrounding borders. No drainage. \par

## 2021-04-22 ENCOUNTER — APPOINTMENT (OUTPATIENT)
Dept: PULMONOLOGY | Facility: CLINIC | Age: 81
End: 2021-04-22
Payer: MEDICARE

## 2021-04-22 VITALS
RESPIRATION RATE: 16 BRPM | WEIGHT: 141 LBS | SYSTOLIC BLOOD PRESSURE: 120 MMHG | HEART RATE: 84 BPM | OXYGEN SATURATION: 96 % | DIASTOLIC BLOOD PRESSURE: 80 MMHG | BODY MASS INDEX: 29.47 KG/M2

## 2021-04-22 DIAGNOSIS — E66.9 OBESITY, UNSPECIFIED: ICD-10-CM

## 2021-04-22 DIAGNOSIS — J45.20 MILD INTERMITTENT ASTHMA, UNCOMPLICATED: ICD-10-CM

## 2021-04-22 DIAGNOSIS — R06.00 DYSPNEA, UNSPECIFIED: ICD-10-CM

## 2021-04-22 PROCEDURE — 99213 OFFICE O/P EST LOW 20 MIN: CPT

## 2021-04-22 RX ORDER — HYDROMORPHONE HYDROCHLORIDE 4 MG/1
4 TABLET ORAL
Qty: 28 | Refills: 0 | Status: COMPLETED | COMMUNITY
Start: 2021-02-04 | End: 2021-04-22

## 2021-04-22 RX ORDER — ONDANSETRON 4 MG/1
4 TABLET ORAL EVERY 6 HOURS
Qty: 28 | Refills: 0 | Status: COMPLETED | COMMUNITY
Start: 2021-02-04 | End: 2021-04-22

## 2021-04-22 RX ORDER — METHOCARBAMOL 750 MG/1
750 TABLET, FILM COATED ORAL 3 TIMES DAILY
Qty: 45 | Refills: 1 | Status: COMPLETED | COMMUNITY
Start: 2021-01-27 | End: 2021-04-22

## 2021-04-22 RX ORDER — OXYCODONE 5 MG/1
5 TABLET ORAL EVERY 6 HOURS
Qty: 28 | Refills: 0 | Status: COMPLETED | COMMUNITY
Start: 2021-02-04 | End: 2021-04-22

## 2021-04-22 RX ORDER — HYDROMORPHONE HYDROCHLORIDE 4 MG/1
4 TABLET ORAL
Qty: 28 | Refills: 0 | Status: COMPLETED | COMMUNITY
Start: 2021-01-27 | End: 2021-04-22

## 2021-04-22 NOTE — HISTORY OF PRESENT ILLNESS
[FreeTextEntry1] : 77 yo obese female distant trivial smoker with long-standing, mild asthma managed by Dr Alanis, now looking for FU in the [employer] system.\par Chronic, stable, mild, two flight ASKEW relieved with 1 minute of rest\par Silent sinus syndrome followed by Dr Queen in the past - largely resolved post drainage\par Virtually never requiring steroids\par Never intubated\par No known allergies\par No asa intolerance nor nasal polyposis\par Controlled on Symbicort alone\par Rarely needs rescue albuterol \par \par 4/16/19\par Doing OK\par Symbicort is expensive\par \par 6/19/20\par Back pain\par Slight increase SOB\par Hasn't used rescue albuterol\par \par The patient has been practicing safety guidelines for COVID-19 pandemic crisis (including social distancing, mask use and hand washing) and has been mostly homebound.\par Didn't let her son visit.  Anxious over COVID\par \par 10/22/20\par The patient has been practicing safety guidelines for COVID-19 pandemic crisis (including social distancing, mask use and hand washing) and has been mostly homebound.\par Mild ASKEW\par Cardiac CT with small nodules\par \par 4/22/21\par Few issues with Dust mite measures and Cat avoidance\par New needs rescue albuterol \par

## 2021-04-22 NOTE — ASSESSMENT
[FreeTextEntry1] : Mild intermittent asthma controlled with LABA/ICS and avoidance of Cats\par Silent Sinus Syndrome - inactive\par Suspect mild GERD\par Obesity\par Low back pain\par Anxiety\par Dyspnea related to weight and deconditioning exacerbated by COVID and Back pain\par Tiny nodules in a 80 year old distant trivial smoker without exposures or FH of malignancy does not warrant FU imaging.  In fact FU imaging is more likely to lead to unjustified LE with risk of harm than to be beneficial

## 2021-05-03 ENCOUNTER — APPOINTMENT (OUTPATIENT)
Dept: NEUROSURGERY | Facility: CLINIC | Age: 81
End: 2021-05-03
Payer: MEDICARE

## 2021-05-03 VITALS
WEIGHT: 141 LBS | OXYGEN SATURATION: 97 % | BODY MASS INDEX: 29.6 KG/M2 | HEIGHT: 58 IN | TEMPERATURE: 97.8 F | SYSTOLIC BLOOD PRESSURE: 150 MMHG | DIASTOLIC BLOOD PRESSURE: 86 MMHG | HEART RATE: 75 BPM

## 2021-05-03 DIAGNOSIS — M48.062 SPINAL STENOSIS, LUMBAR REGION WITH NEUROGENIC CLAUDICATION: ICD-10-CM

## 2021-05-03 PROCEDURE — 99213 OFFICE O/P EST LOW 20 MIN: CPT

## 2021-05-03 NOTE — REVIEW OF SYSTEMS
[As Noted in HPI] : as noted in HPI [Negative] : Heme/Lymph [Feeling Tired] : not feeling tired [Numbness] : no numbness [Tingling] : no tingling [Incontinence] : no incontinence [FreeTextEntry9] : lower back pain

## 2021-05-03 NOTE — REASON FOR VISIT
[Follow-Up: _____] : a [unfilled] follow-up visit [Spouse] : spouse [FreeTextEntry1] : \par IHSAN KIM is status post status post L4-L5 posterior instrumented fusion with pedicle screw fixation and she is here for a post-op visit. \par Surgery Date: 1/20/2021 Surgery Date: 1/20/2021 Ms. Kim presents for follow up visit at 3 month katie. 79 y/o female with HLD, OA, Asthma, hypothyroidism, DM Type 2 (diet control), Lower back pain, admitted to [facility] on 1/20/21 for L4-L5 Posterior lumbar interbody fusion for long standing lumbar pain that has progressively gotten worse within the past one year. Patient underwent a L4-L5 PLIF with Dr. Carrillo on 1/20/21. She presents and has been compliant with her LSO bracing when OOB. Denies any urinary dysfunction. She is ambulating independently. She endorses mild to moderate lumbar pain with mild pain of the right quadriceps and calf. . Her incision is dry and intact. Wound edges well approximated.  No drainage. \par \par . \par  \par

## 2021-05-03 NOTE — PHYSICAL EXAM
[General Appearance - Alert] : alert [General Appearance - In No Acute Distress] : in no acute distress [General Appearance - Well Nourished] : well nourished [General Appearance - Well Developed] : well developed [Longitudinal] : longitudinal [No Drainage] : without drainage [Normal Skin] : normal [Oriented To Time, Place, And Person] : oriented to person, place, and time [Impaired Insight] : insight and judgment were intact [Affect] : the affect was normal [Mood] : the mood was normal [Person] : oriented to person [Place] : oriented to place [Time] : oriented to time [Span Intact] : the attention span was normal [Comprehension] : comprehension intact [Cranial Nerves Optic (II)] : visual acuity intact bilaterally,  pupils equal round and reactive to light [Cranial Nerves Oculomotor (III)] : extraocular motion intact [Cranial Nerves Trigeminal (V)] : facial sensation intact symmetrically [Cranial Nerves Facial (VII)] : face symmetrical [Cranial Nerves Glossopharyngeal (IX)] : tongue and palate midline [Cranial Nerves Accessory (XI - Cranial And Spinal)] : head turning and shoulder shrug symmetric [Motor Tone] : muscle tone was normal in all four extremities [Motor Strength] : muscle strength was normal in all four extremities [Sensation Tactile Decrease] : light touch was intact [Sensation Pain / Temperature Decrease] : pain and temperature was intact [Abnormal Walk] : normal gait [Balance] : balance was intact [No Visual Abnormalities] : no visible abnormailities [Normal] : normal [Able to toe walk] : the patient was able to toe walk [Able to heel walk] : the patient was able to heel walk [FreeTextEntry1] : lumbar region  [Over the Past 2 Weeks, Have You Felt Down, Depressed, or Hopeless?] : 1.) Over the past 2 weeks, have you felt down, depressed, or hopeless? No [Over the Past 2 Weeks, Have You Felt Little Interest or Pleasure Doing Things?] : 2.) Over the past 2 weeks, have you felt little interest or pleasure doing things? No

## 2021-05-03 NOTE — ASSESSMENT
[FreeTextEntry1] : \par Ms. Tellez is doing well from the postoperative perspective. She is neurologically intact.\par \par \par Plan:\par Continue LSO bracing when OOB\par Start outpatient course of physical therapy for strengthening.\par May start Prolia at 3 month katie.\par Patient knows to call the office if there are any new or worsening symptoms. \par \par  \par

## 2021-06-17 NOTE — DIETITIAN INITIAL EVALUATION ADULT. - FLUID ACCUMULATION
1+ ankles Kathi: Ptient with one week of fatigue and generalized weakness. sob on exertion. will get labs, cxr, ekg, cardiac enzymes. new onset afib. will admit

## 2022-07-14 ENCOUNTER — NON-APPOINTMENT (OUTPATIENT)
Age: 82
End: 2022-07-14

## 2023-09-18 NOTE — ASSESSMENT
[FreeTextEntry1] : Ms. Tellez is doing well from the postoperative perspective. She is neurologically intact.\par \par \par Plan:\par Continue LSO bracing when OOB\par Start outpatient course of physical therapy for strengthening.\par Follow up in 2 months to assess her recovery.\par May start Prolia at 3 month katie.\par Patient knows to call the office if there are any new or worsening symptoms. None

## 2024-02-20 ENCOUNTER — APPOINTMENT (OUTPATIENT)
Dept: URBAN - METROPOLITAN AREA CLINIC 294 | Age: 84
Setting detail: DERMATOLOGY
End: 2024-02-20

## 2024-02-20 DIAGNOSIS — L20.89 OTHER ATOPIC DERMATITIS: ICD-10-CM

## 2024-02-20 DIAGNOSIS — D485 NEOPLASM OF UNCERTAIN BEHAVIOR OF SKIN: ICD-10-CM

## 2024-02-20 PROBLEM — D48.5 NEOPLASM OF UNCERTAIN BEHAVIOR OF SKIN: Status: ACTIVE | Noted: 2024-02-20

## 2024-02-20 PROCEDURE — 11102 TANGNTL BX SKIN SINGLE LES: CPT

## 2024-02-20 PROCEDURE — OTHER PRESCRIPTION MEDICATION MANAGEMENT: OTHER

## 2024-02-20 PROCEDURE — 11103 TANGNTL BX SKIN EA SEP/ADDL: CPT

## 2024-02-20 PROCEDURE — OTHER PRESCRIPTION: OTHER

## 2024-02-20 PROCEDURE — 99204 OFFICE O/P NEW MOD 45 MIN: CPT | Mod: 25

## 2024-02-20 PROCEDURE — OTHER COUNSELING: OTHER

## 2024-02-20 PROCEDURE — OTHER BIOPSY BY SHAVE METHOD: OTHER

## 2024-02-20 PROCEDURE — OTHER MIPS QUALITY: OTHER

## 2024-02-20 RX ORDER — BETAMETHASONE DIPROPIONATE 0.5 MG/G
OINTMENT, AUGMENTED TOPICAL
Qty: 50 | Refills: 3 | Status: ERX | COMMUNITY
Start: 2024-02-20

## 2024-02-20 ASSESSMENT — LOCATION DETAILED DESCRIPTION DERM
LOCATION DETAILED: RIGHT PROXIMAL CALF
LOCATION DETAILED: LEFT LATERAL DISTAL PRETIBIAL REGION

## 2024-02-20 ASSESSMENT — LOCATION SIMPLE DESCRIPTION DERM
LOCATION SIMPLE: LEFT PRETIBIAL REGION
LOCATION SIMPLE: RIGHT CALF

## 2024-02-20 ASSESSMENT — LOCATION ZONE DERM: LOCATION ZONE: LEG

## 2024-02-20 NOTE — PROCEDURE: BIOPSY BY SHAVE METHOD
Maritza Esquivel)
Detail Level: Detailed
Depth Of Biopsy: dermis
Was A Bandage Applied: Yes
Size Of Lesion In Cm: 0
Biopsy Type: H and E
Biopsy Method: Dermablade
Anesthesia Type: 1% lidocaine with epinephrine
Anesthesia Volume In Cc: 0.5
Hemostasis: Drysol
Wound Care: Petrolatum
Dressing: bandage
Destruction After The Procedure: No
Type Of Destruction Used: Curettage
Curettage Text: The wound bed was treated with curettage after the biopsy was performed.
Cryotherapy Text: The wound bed was treated with cryotherapy after the biopsy was performed.
Electrodesiccation Text: The wound bed was treated with electrodesiccation after the biopsy was performed.
Electrodesiccation And Curettage Text: The wound bed was treated with electrodesiccation and curettage after the biopsy was performed.
Silver Nitrate Text: The wound bed was treated with silver nitrate after the biopsy was performed.
Lab: [id number]
Consent: Written or verbal consent was obtained and risks were reviewed including but not limited to scarring, infection, bleeding, scabbing, incomplete removal, nerve damage and allergy to anesthesia.
Post-Care Instructions: I reviewed with the patient in detail post-care instructions. Patient is to keep the biopsy site dry overnight, and then apply bacitracin twice daily until healed. Patient may apply hydrogen peroxide soaks to remove any crusting.
Notification Instructions: Patient will be notified of biopsy results. However, patient instructed to call the office if not contacted within 2 weeks.
Billing Type: Third-Party Bill
Information: Selecting Yes will display possible errors in your note based on the variables you have selected. This validation is only offered as a suggestion for you. PLEASE NOTE THAT THE VALIDATION TEXT WILL BE REMOVED WHEN YOU FINALIZE YOUR NOTE. IF YOU WANT TO FAX A PRELIMINARY NOTE YOU WILL NEED TO TOGGLE THIS TO 'NO' IF YOU DO NOT WANT IT IN YOUR FAXED NOTE.

## 2024-03-05 ENCOUNTER — APPOINTMENT (OUTPATIENT)
Dept: URBAN - METROPOLITAN AREA CLINIC 294 | Age: 84
Setting detail: DERMATOLOGY
End: 2024-03-05

## 2024-03-05 DIAGNOSIS — D485 NEOPLASM OF UNCERTAIN BEHAVIOR OF SKIN: ICD-10-CM

## 2024-03-05 PROBLEM — D48.5 NEOPLASM OF UNCERTAIN BEHAVIOR OF SKIN: Status: ACTIVE | Noted: 2024-03-05

## 2024-03-05 PROBLEM — C44.722 SQUAMOUS CELL CARCINOMA OF SKIN OF RIGHT LOWER LIMB, INCLUDING HIP: Status: ACTIVE | Noted: 2024-03-05

## 2024-03-05 PROCEDURE — OTHER BIOPSY BY SHAVE METHOD: OTHER

## 2024-03-05 PROCEDURE — OTHER MIPS QUALITY: OTHER

## 2024-03-05 PROCEDURE — OTHER CURETTAGE AND DESTRUCTION: OTHER

## 2024-03-05 PROCEDURE — 17264 DSTRJ MAL LES T/A/L 3.1-4.0: CPT

## 2024-03-05 PROCEDURE — OTHER ADDITIONAL NOTES: OTHER

## 2024-03-05 PROCEDURE — 11102 TANGNTL BX SKIN SINGLE LES: CPT | Mod: 59

## 2024-03-05 PROCEDURE — OTHER COUNSELING: OTHER

## 2024-03-05 ASSESSMENT — LOCATION SIMPLE DESCRIPTION DERM: LOCATION SIMPLE: RIGHT PRETIBIAL REGION

## 2024-03-05 ASSESSMENT — LOCATION DETAILED DESCRIPTION DERM: LOCATION DETAILED: RIGHT DISTAL PRETIBIAL REGION

## 2024-03-05 ASSESSMENT — LOCATION ZONE DERM: LOCATION ZONE: LEG

## 2024-03-05 NOTE — PROCEDURE: BIOPSY BY SHAVE METHOD
Detail Level: Detailed
Depth Of Biopsy: dermis
Was A Bandage Applied: Yes
Size Of Lesion In Cm: 0.1
X Size Of Lesion In Cm: 0
Biopsy Type: H and E
Biopsy Method: Dermablade
Anesthesia Type: 1% lidocaine with epinephrine
Anesthesia Volume In Cc: 0.5
Hemostasis: Drysol
Wound Care: Petrolatum
Dressing: bandage
Destruction After The Procedure: No
Type Of Destruction Used: Curettage
Curettage Text: The wound bed was treated with curettage after the biopsy was performed.
Cryotherapy Text: The wound bed was treated with cryotherapy after the biopsy was performed.
Electrodesiccation Text: The wound bed was treated with electrodesiccation after the biopsy was performed.
Electrodesiccation And Curettage Text: The wound bed was treated with electrodesiccation and curettage after the biopsy was performed.
Silver Nitrate Text: The wound bed was treated with silver nitrate after the biopsy was performed.
Lab: [id number]
Consent: Written or verbal consent was obtained and risks were reviewed including but not limited to scarring, infection, bleeding, scabbing, incomplete removal, nerve damage and allergy to anesthesia.
Post-Care Instructions: I reviewed with the patient in detail post-care instructions. Patient is to keep the biopsy site dry overnight, and then apply bacitracin twice daily until healed. Patient may apply hydrogen peroxide soaks to remove any crusting.
Notification Instructions: Patient will be notified of biopsy results. However, patient instructed to call the office if not contacted within 2 weeks.
Billing Type: Third-Party Bill
Information: Selecting Yes will display possible errors in your note based on the variables you have selected. This validation is only offered as a suggestion for you. PLEASE NOTE THAT THE VALIDATION TEXT WILL BE REMOVED WHEN YOU FINALIZE YOUR NOTE. IF YOU WANT TO FAX A PRELIMINARY NOTE YOU WILL NEED TO TOGGLE THIS TO 'NO' IF YOU DO NOT WANT IT IN YOUR FAXED NOTE.

## 2024-03-05 NOTE — PROCEDURE: ADDITIONAL NOTES
Render Risk Assessment In Note?: no
Additional Notes: superficial type\\nwill reassess for clinical clearance in 2 months-large size lesion, declined exc
Detail Level: Simple

## 2024-03-05 NOTE — PROCEDURE: CURETTAGE AND DESTRUCTION
Detail Level: Detailed
Number Of Curettages: 3
Size Of Lesion In Cm: 3.2
Size Of Lesion After Curettage: 3.4
Add Intralesional Injection: No
Total Volume (Ccs): 1
Anesthesia Type: 1% lidocaine with epinephrine
Cautery Type: electrodesiccation
What Was Performed First?: Curettage
Final Size Statement: The size of the lesion after curettage was
Additional Information: (Optional): The wound was cleaned, and a pressure dressing was applied.  The patient received detailed post-op instructions.
Consent was obtained from the patient. The risks, benefits and alternatives to therapy were discussed in detail. Specifically, the risks of infection, scarring, bleeding, prolonged wound healing, nerve injury, incomplete removal, allergy to anesthesia and recurrence were addressed. Alternatives to ED&C, such as: surgical removal and XRT were also discussed.  Prior to the procedure, the treatment site was clearly identified and confirmed by the patient. All components of Universal Protocol/PAUSE Rule completed.
Post-Care Instructions: I reviewed with the patient in detail post-care instructions. Patient is to keep the area dry for 48 hours, and not to engage in any swimming until the area is healed. Should the patient develop any fevers, chills, bleeding, severe pain patient will contact the office immediately.
Bill As A Line Item Or As Units: Line Item

## 2024-03-12 ENCOUNTER — APPOINTMENT (OUTPATIENT)
Dept: URBAN - METROPOLITAN AREA CLINIC 294 | Age: 84
Setting detail: DERMATOLOGY
End: 2024-03-12

## 2024-03-12 DIAGNOSIS — R20.8 OTHER DISTURBANCES OF SKIN SENSATION: ICD-10-CM

## 2024-03-12 DIAGNOSIS — B373 CANDIDIASIS OF VULVA AND VAGINA: ICD-10-CM

## 2024-03-12 DIAGNOSIS — L57.0 ACTINIC KERATOSIS: ICD-10-CM

## 2024-03-12 DIAGNOSIS — L0391 CELLULITIS AND ABSCESS OF UNSPECIFIED SITES: ICD-10-CM

## 2024-03-12 DIAGNOSIS — L0390 CELLULITIS AND ABSCESS OF UNSPECIFIED SITES: ICD-10-CM

## 2024-03-12 PROBLEM — L03.115 CELLULITIS OF RIGHT LOWER LIMB: Status: ACTIVE | Noted: 2024-03-12

## 2024-03-12 PROBLEM — B37.31 ACUTE CANDIDIASIS OF VULVA AND VAGINA: Status: ACTIVE | Noted: 2024-03-12

## 2024-03-12 PROCEDURE — OTHER MIPS QUALITY: OTHER

## 2024-03-12 PROCEDURE — OTHER ADDITIONAL NOTES: OTHER

## 2024-03-12 PROCEDURE — OTHER COUNSELING: OTHER

## 2024-03-12 PROCEDURE — OTHER PRESCRIPTION: OTHER

## 2024-03-12 PROCEDURE — OTHER PRESCRIPTION MEDICATION MANAGEMENT: OTHER

## 2024-03-12 PROCEDURE — OTHER ORDER TESTS: OTHER

## 2024-03-12 RX ORDER — DOXYCYCLINE HYCLATE 100 MG/1
CAPSULE, GELATIN COATED ORAL
Qty: 20 | Refills: 0 | Status: ERX | COMMUNITY
Start: 2024-03-12

## 2024-03-12 RX ORDER — MUPIROCIN 20 MG/G
OINTMENT TOPICAL
Qty: 44 | Refills: 5 | Status: ERX

## 2024-03-12 RX ORDER — FLUCONAZOLE 150 MG/1
TABLET ORAL
Qty: 2 | Refills: 2 | Status: ERX | COMMUNITY
Start: 2024-03-12

## 2024-03-12 RX ORDER — MUPIROCIN 20 MG/G
OINTMENT TOPICAL
Qty: 22 | Refills: 0 | Status: ERX | COMMUNITY
Start: 2024-03-12

## 2024-03-12 ASSESSMENT — LOCATION SIMPLE DESCRIPTION DERM
LOCATION SIMPLE: RIGHT ANKLE
LOCATION SIMPLE: RIGHT CALF

## 2024-03-12 ASSESSMENT — LOCATION ZONE DERM
LOCATION ZONE: LEG
LOCATION ZONE: LEG

## 2024-03-12 ASSESSMENT — LOCATION DETAILED DESCRIPTION DERM
LOCATION DETAILED: RIGHT ANKLE
LOCATION DETAILED: RIGHT PROXIMAL LATERAL CALF

## 2024-03-12 NOTE — PROCEDURE: ADDITIONAL NOTES
Additional Notes: Reviewed path report will treat after wound infection heals
Render Risk Assessment In Note?: no
Detail Level: Simple

## 2024-03-12 NOTE — PROCEDURE: PRESCRIPTION MEDICATION MANAGEMENT
Initiate Treatment: doxycycline hyclate 100 mg capsule Take one pill po bid\\nFor 10 days\\nmupirocin 2 % topical ointment AAA TID
Render In Strict Bullet Format?: No
Detail Level: Zone

## 2024-03-12 NOTE — PROCEDURE: ORDER TESTS
Bill For Surgical Tray: no
Billing Type: Third-Party Bill
Performing Laboratory: [id number]
Expected Date Of Service: 03/12/2024

## 2024-03-19 ENCOUNTER — APPOINTMENT (OUTPATIENT)
Dept: URBAN - METROPOLITAN AREA CLINIC 294 | Age: 84
Setting detail: DERMATOLOGY
End: 2024-03-19

## 2024-03-19 DIAGNOSIS — L0390 CELLULITIS AND ABSCESS OF UNSPECIFIED SITES: ICD-10-CM

## 2024-03-19 DIAGNOSIS — L0391 CELLULITIS AND ABSCESS OF UNSPECIFIED SITES: ICD-10-CM

## 2024-03-19 PROBLEM — L03.115 CELLULITIS OF RIGHT LOWER LIMB: Status: ACTIVE | Noted: 2024-03-19

## 2024-03-19 PROCEDURE — OTHER PRESCRIPTION MEDICATION MANAGEMENT: OTHER

## 2024-03-19 PROCEDURE — OTHER COUNSELING: OTHER

## 2024-03-19 ASSESSMENT — LOCATION SIMPLE DESCRIPTION DERM: LOCATION SIMPLE: RIGHT CALF

## 2024-03-19 ASSESSMENT — LOCATION ZONE DERM: LOCATION ZONE: LEG

## 2024-03-19 ASSESSMENT — LOCATION DETAILED DESCRIPTION DERM: LOCATION DETAILED: RIGHT PROXIMAL LATERAL CALF

## 2024-03-19 NOTE — PROCEDURE: PRESCRIPTION MEDICATION MANAGEMENT
Continue Regimen: .\\n\\ndoxycycline hyclate 100 mg capsule Take one pill po bid\\nFor 10 days (finish doxycycline) 3 more days\\nmupirocin 2 % topical ointment AAA TID
Render In Strict Bullet Format?: No
Detail Level: Zone

## 2024-04-09 ENCOUNTER — APPOINTMENT (OUTPATIENT)
Dept: URBAN - METROPOLITAN AREA CLINIC 294 | Age: 84
Setting detail: DERMATOLOGY
End: 2024-04-09

## 2024-04-09 DIAGNOSIS — L57.0 ACTINIC KERATOSIS: ICD-10-CM

## 2024-04-09 DIAGNOSIS — Z48.817 ENCOUNTER FOR SURGICAL AFTERCARE FOLLOWING SURGERY ON THE SKIN AND SUBCUTANEOUS TISSUE: ICD-10-CM

## 2024-04-09 PROCEDURE — OTHER ADDITIONAL NOTES: OTHER

## 2024-04-09 PROCEDURE — 17000 DESTRUCT PREMALG LESION: CPT

## 2024-04-09 PROCEDURE — OTHER LIQUID NITROGEN: OTHER

## 2024-04-09 PROCEDURE — OTHER COUNSELING: OTHER

## 2024-04-09 ASSESSMENT — LOCATION SIMPLE DESCRIPTION DERM
LOCATION SIMPLE: LEFT ANKLE
LOCATION SIMPLE: RIGHT PRETIBIAL REGION
LOCATION SIMPLE: LEFT ANKLE

## 2024-04-09 ASSESSMENT — LOCATION ZONE DERM
LOCATION ZONE: LEG
LOCATION ZONE: LEG

## 2024-04-09 ASSESSMENT — LOCATION DETAILED DESCRIPTION DERM
LOCATION DETAILED: LEFT ANKLE
LOCATION DETAILED: RIGHT LATERAL PROXIMAL PRETIBIAL REGION
LOCATION DETAILED: LEFT ANKLE

## 2024-04-09 NOTE — PROCEDURE: LIQUID NITROGEN
Show Applicator Variable?: Yes
Consent: The patient's verbal or written consent was obtained including but not limited to risks of crusting, scabbing, blistering, scarring, darker or lighter pigmentary change, recurrence, incomplete removal and infection.
Render Post-Care Instructions In Note?: no
Detail Level: Detailed
Post-Care Instructions: I reviewed with the patient in detail post-care instructions. Patient is to wear sunprotection, and avoid picking at any of the treated lesions. Pt may apply Vaseline to crusted or scabbing areas.
Duration Of Freeze Thaw-Cycle (Seconds): 5
Number Of Freeze-Thaw Cycles: 1 freeze-thaw cycle

## 2024-04-09 NOTE — PROCEDURE: ADDITIONAL NOTES
Render Risk Assessment In Note?: no
Detail Level: Simple
Additional Notes: healing well- 2 more weeks of daily vaseline and bandage

## 2024-07-16 ENCOUNTER — APPOINTMENT (OUTPATIENT)
Dept: URBAN - METROPOLITAN AREA CLINIC 294 | Age: 84
Setting detail: DERMATOLOGY
End: 2024-07-16

## 2024-07-16 DIAGNOSIS — L30.4 ERYTHEMA INTERTRIGO: ICD-10-CM

## 2024-07-16 DIAGNOSIS — Z85.828 PERSONAL HISTORY OF OTHER MALIGNANT NEOPLASM OF SKIN: ICD-10-CM

## 2024-07-16 PROCEDURE — OTHER COUNSELING: OTHER

## 2024-07-16 PROCEDURE — OTHER PRESCRIPTION MEDICATION MANAGEMENT: OTHER

## 2024-07-16 PROCEDURE — OTHER PRESCRIPTION: OTHER

## 2024-07-16 PROCEDURE — 99214 OFFICE O/P EST MOD 30 MIN: CPT

## 2024-07-16 PROCEDURE — OTHER MIPS QUALITY: OTHER

## 2024-07-16 RX ORDER — KETOCONAZOLE 20 MG/G
CREAM TOPICAL BID
Qty: 60 | Refills: 6 | Status: ERX | COMMUNITY
Start: 2024-07-16

## 2024-07-16 RX ORDER — TRIAMCINOLONE ACETONIDE 0.25 MG/G
CREAM TOPICAL
Qty: 80 | Refills: 6 | Status: ERX | COMMUNITY
Start: 2024-07-16

## 2024-07-16 ASSESSMENT — LOCATION SIMPLE DESCRIPTION DERM
LOCATION SIMPLE: LEFT AXILLARY VAULT
LOCATION SIMPLE: RIGHT CALF

## 2024-07-16 ASSESSMENT — LOCATION DETAILED DESCRIPTION DERM
LOCATION DETAILED: LEFT AXILLARY VAULT
LOCATION DETAILED: RIGHT DISTAL CALF

## 2024-07-16 ASSESSMENT — LOCATION ZONE DERM
LOCATION ZONE: AXILLAE
LOCATION ZONE: LEG

## 2024-07-16 NOTE — PROCEDURE: PRESCRIPTION MEDICATION MANAGEMENT
Render In Strict Bullet Format?: No
Initiate Treatment: .\\n\\ntriamcinolone acetonide 0.025 % topical cream: Apply to rash twice daily for 2 weeks\\n\\nKetoconazole 2% topical cream: apply to rash twice daily for 2 weeks as needed for flares.
Detail Level: Zone
Plan: Recommended Zeasorb powder QD

## 2024-07-16 NOTE — HPI: WOUND, LOWER EXTREMITY
How Is Your Wound Healing?: healing slowly
Is This A New Presentation, Or A Follow-Up?: Follow Up Lower Extremity Wound

## 2024-09-05 NOTE — PHYSICAL THERAPY INITIAL EVALUATION ADULT - PATIENT PROFILE REVIEW, REHAB EVAL
Initiate Treatment: Cephalexin 500 bid x 10 days, joesoef soap wash daily, ketoconazole cream bid, hydrocortisone cream bid x 2 weeks Render In Strict Bullet Format?: No Detail Level: Zone yes

## 2024-10-03 NOTE — BRIEF OPERATIVE NOTE - ESTIMATED BLOOD LOSS
Triage Call    Chief complaint: both arms pain    Description: pt called states that  both arms are in pain, on and off, c/o  tingling sensation and  was supposed to do the EMG/Nerve conduction study for her carpal tunnel syndrome, but now she's having problem with her insurance and felt her body is falling apart. Pt denies SOB, chest pain, lightheadedness or headache. Pt states she knows that Dr. Valero wont do anything about her arm pain because it is not related to her heart.    Medications discussed: none    Next appt: 11/25/24 with Dr. Valero    Plan: advised pt to call her PCP  regarding her arm pain, recommended try cool compress and if her symptoms get worse go to urgent care or ED for further recommendation, pt verbalized understanding and agreed with the plan of care.    Will send message to Dr. Valero for further recommendation and will call pt back.  Kate De Leon, RN, BSN      
200
200

## 2025-06-19 ENCOUNTER — APPOINTMENT (OUTPATIENT)
Dept: URBAN - METROPOLITAN AREA CLINIC 294 | Age: 85
Setting detail: DERMATOLOGY
End: 2025-06-19

## 2025-06-19 DIAGNOSIS — L57.0 ACTINIC KERATOSIS: ICD-10-CM

## 2025-06-19 DIAGNOSIS — D485 NEOPLASM OF UNCERTAIN BEHAVIOR OF SKIN: ICD-10-CM

## 2025-06-19 DIAGNOSIS — D22 MELANOCYTIC NEVI: ICD-10-CM

## 2025-06-19 DIAGNOSIS — Z85.820 PERSONAL HISTORY OF MALIGNANT MELANOMA OF SKIN: ICD-10-CM

## 2025-06-19 DIAGNOSIS — L82.1 OTHER SEBORRHEIC KERATOSIS: ICD-10-CM

## 2025-06-19 DIAGNOSIS — Z12.83 ENCOUNTER FOR SCREENING FOR MALIGNANT NEOPLASM OF SKIN: ICD-10-CM

## 2025-06-19 DIAGNOSIS — L57.8 OTHER SKIN CHANGES DUE TO CHRONIC EXPOSURE TO NONIONIZING RADIATION: ICD-10-CM

## 2025-06-19 PROBLEM — D22.9 MELANOCYTIC NEVI, UNSPECIFIED: Status: ACTIVE | Noted: 2025-06-19

## 2025-06-19 PROBLEM — D48.5 NEOPLASM OF UNCERTAIN BEHAVIOR OF SKIN: Status: ACTIVE | Noted: 2025-06-19

## 2025-06-19 PROBLEM — D23.72 OTHER BENIGN NEOPLASM OF SKIN OF LEFT LOWER LIMB, INCLUDING HIP: Status: ACTIVE | Noted: 2025-06-19

## 2025-06-19 PROCEDURE — 17003 DESTRUCT PREMALG LES 2-14: CPT

## 2025-06-19 PROCEDURE — OTHER LIQUID NITROGEN: OTHER

## 2025-06-19 PROCEDURE — 17000 DESTRUCT PREMALG LESION: CPT | Mod: 59

## 2025-06-19 PROCEDURE — OTHER MIPS QUALITY: OTHER

## 2025-06-19 PROCEDURE — 99213 OFFICE O/P EST LOW 20 MIN: CPT | Mod: 25

## 2025-06-19 PROCEDURE — 11103 TANGNTL BX SKIN EA SEP/ADDL: CPT

## 2025-06-19 PROCEDURE — 11102 TANGNTL BX SKIN SINGLE LES: CPT

## 2025-06-19 PROCEDURE — OTHER COUNSELING: OTHER

## 2025-06-19 PROCEDURE — OTHER BIOPSY BY SHAVE METHOD: OTHER

## 2025-06-19 ASSESSMENT — LOCATION ZONE DERM
LOCATION ZONE: FACE
LOCATION ZONE: LEG
LOCATION ZONE: ARM
LOCATION ZONE: LIP

## 2025-06-19 ASSESSMENT — LOCATION DETAILED DESCRIPTION DERM
LOCATION DETAILED: RIGHT SUBMANDIBULAR AREA
LOCATION DETAILED: RIGHT ANTERIOR PROXIMAL THIGH
LOCATION DETAILED: RIGHT DISTAL POSTERIOR THIGH
LOCATION DETAILED: LEFT SUPERIOR LATERAL MALAR CHEEK
LOCATION DETAILED: LEFT DISTAL PRETIBIAL REGION
LOCATION DETAILED: RIGHT ANTERIOR DISTAL THIGH
LOCATION DETAILED: RIGHT VENTRAL PROXIMAL FOREARM
LOCATION DETAILED: LEFT PROXIMAL PRETIBIAL REGION
LOCATION DETAILED: PHILTRUM
LOCATION DETAILED: RIGHT PROXIMAL CALF
LOCATION DETAILED: LEFT DISTAL POSTERIOR THIGH

## 2025-06-19 ASSESSMENT — LOCATION SIMPLE DESCRIPTION DERM
LOCATION SIMPLE: UPPER LIP
LOCATION SIMPLE: LEFT CHEEK
LOCATION SIMPLE: RIGHT FOREARM
LOCATION SIMPLE: LEFT PRETIBIAL REGION
LOCATION SIMPLE: RIGHT SUBMANDIBULAR AREA
LOCATION SIMPLE: RIGHT THIGH
LOCATION SIMPLE: LEFT POSTERIOR THIGH
LOCATION SIMPLE: RIGHT CALF
LOCATION SIMPLE: RIGHT POSTERIOR THIGH

## 2025-07-02 NOTE — H&P PST ADULT - EYES
[FreeTextEntry3] : I, Dr. Falcon, personally performed the evaluation and management for this post op patient who presents today. Evaluation includes conducting the examination, assessing all conditions and establishing a plan of care moving forward. Today, the ACP, Tr Garza, was here to observe my evaluation and management services for this patient.   EOMI; PERRL; no drainage or redness